# Patient Record
Sex: MALE | Race: WHITE | NOT HISPANIC OR LATINO | Employment: OTHER | URBAN - METROPOLITAN AREA
[De-identification: names, ages, dates, MRNs, and addresses within clinical notes are randomized per-mention and may not be internally consistent; named-entity substitution may affect disease eponyms.]

---

## 2018-05-25 ENCOUNTER — OFFICE VISIT (OUTPATIENT)
Dept: URGENT CARE | Facility: CLINIC | Age: 68
End: 2018-05-25
Payer: COMMERCIAL

## 2018-05-25 VITALS
HEART RATE: 66 BPM | DIASTOLIC BLOOD PRESSURE: 87 MMHG | RESPIRATION RATE: 18 BRPM | OXYGEN SATURATION: 98 % | TEMPERATURE: 97.3 F | SYSTOLIC BLOOD PRESSURE: 165 MMHG

## 2018-05-25 DIAGNOSIS — H73.011 BULLOUS MYRINGITIS, RIGHT EAR: Primary | ICD-10-CM

## 2018-05-25 PROCEDURE — G0463 HOSPITAL OUTPT CLINIC VISIT: HCPCS | Performed by: FAMILY MEDICINE

## 2018-05-25 PROCEDURE — 99203 OFFICE O/P NEW LOW 30 MIN: CPT | Performed by: FAMILY MEDICINE

## 2018-05-25 RX ORDER — CLARITHROMYCIN 500 MG/1
500 TABLET, COATED ORAL EVERY 12 HOURS SCHEDULED
Qty: 14 TABLET | Refills: 0 | Status: SHIPPED | OUTPATIENT
Start: 2018-05-25 | End: 2018-06-01

## 2018-05-25 RX ORDER — OFLOXACIN 3 MG/ML
SOLUTION AURICULAR (OTIC)
Qty: 5 ML | Refills: 1 | Status: SHIPPED | OUTPATIENT
Start: 2018-05-25 | End: 2020-12-09 | Stop reason: ALTCHOICE

## 2018-05-25 RX ORDER — LOSARTAN POTASSIUM 25 MG/1
25 TABLET ORAL DAILY
COMMUNITY
End: 2020-12-09 | Stop reason: ALTCHOICE

## 2018-05-25 NOTE — PATIENT INSTRUCTIONS
As per verbal instructions please keep the right ear totally dry and do not get water in the ear  Your given a card for Dr Daisy Daniels, ears nose and throat doctor  Please call his office today and get a quick appointment  He will be given drops to put into the right ear with cotton to follow twice a day  You have no fever today so oral antibiotics were not given  Should get a fever greater than 101 please return for antibiotics or presents to the local emergency room

## 2018-05-25 NOTE — PROGRESS NOTES
[email or web address] - Patient Visit Note  Cecilia Orr 79 y o  male MRN: [id number]      Assessment / Plan:  Bullous myringitis, right ear [H73 011]  1  Bullous myringitis, right ear  ofloxacin (FLOXIN) 0 3 % otic solution     Reason For Visit / Chief Complaint  Chief Complaint   Patient presents with    Earache     Pt c/o right ear pain since this morning    Georgette Ormond Discussion:  Patient and wife were made aware of the bull eye and perforation that exist on the right ear  There were given a card for the patient to call today, Friday, to see Dr Philipp Saleh, ENT doctor  He will be using the drops as directed he will keep the ear dry and if fever develops over 101 will present to the local emergency room over the holiday weekend or return to this clinic for systemic antibiotics  No systemic antibiotics were given at this time the patient remains afebrile  ADDENDUM:   I spoke with Dr Magdy WANG regarding their protocol for adult bullous myringitis,  He suggested adding biaxin (ordered)  I spoke to Ms Thapa Adrien,  She agrees to adding Biaxin and will  the Rx for her      HPI:  Cecilia Orr is a 79 y o  male Patient           Who  Presents with his wife with a history of recent right ear pain  Patient states he woke up early this morning with extreme pain on the right ear on felt sort of a pop in the right ear and the right ear discharged small amount of blood  Since that time his pain has diminished to 2/10 were previously it was 9/10 prior to the pop and the drainage  The ears presently draining clear fluid at the at this time The patient had dental work done yesterday where a tooth was prepped and a crown was placed on the upper right mandible however no root canal was performed at that time  Patient is a known hypertensive on losartan only he is allergic to no foods or medication    He has had no fever or upper respiratory illness in the past several days he has no recent trauma  He has had many ear infections on the right with scarring noted admits the bullae  Yelena Sole Historical Information   No past medical history on file  No past surgical history on file  Social History   History   Alcohol use Not on file     History   Drug use: Unknown     History   Smoking Status    Not on file   Smokeless Tobacco    Not on file     No family history on file        ALLERGIES:       No Known Allergies    MEDS:    Current Outpatient Prescriptions:     losartan (COZAAR) 25 mg tablet, Take 25 mg by mouth daily, Disp: , Rfl:     ofloxacin (FLOXIN) 0 3 % otic solution, Administer 3 drops into right ear am and pm followed by cotton  Please see the [facility] and Throat Dr also, Disp: 5 mL, Rfl: 1    FACILITY ADMINISTERED MEDS:        REVIEW OF SYSTEMS    GENERAL: NEGATIVE for:  Generalized Fatigue                             Chills                              Fever                             Myalgias     OPTHALMIC: NEGATIVE for:  Diplopia                            Scotomata                            Visual Changes                            Blurred Vision     ENT:  EARS Positive for:  Hearing Difficulty on the right only                            Tinnitus on the right only, low-pitched  No Vertigo  no Dizziness  On the right Ear Pain  On the right Ear Drainage               NOSE NEGATIVE for:  Nasal Congestion                            Nasal Discharge                            Sinus Pain / Pressure               THROAT NEGATIVE for:  Sore Throat / Throat Pain                            Difficulty Swallowing     RESPIRATORY: NEGATIVE for:  Cough                            Wheezing                            Sputum Production                            Sob / Tachypnea                            Hemoptysis     CARDIOVASCULAR: NEGATIVE for:  Chest Pain                             SOB (cardiac Related) Dyspnea on Exertion                             Orthopnea                             PND                             Leg Edema                             Palpitations                               Irregularities/rythym                       CURRENT VITALS:   Blood Pressure: 165/87 (05/25/18 0840)  Pulse: 66 (05/25/18 0840)  Temperature: (!) 97 3 °F (36 3 °C) (05/25/18 0840)  Respirations: 18 (05/25/18 0840)  SpO2: 98 % (05/25/18 0840)  /87   Pulse 66   Temp (!) 97 3 °F (36 3 °C)   Resp 18   SpO2 98%       PHYSICAL EXAM:         General Appearance:    Alert, cooperative, no apparent distress, appears stated age     Oriented x3    Head:    Normocephalic, without obvious abnormality, atraumatic   Eyes:      EOM's intact,      JARRELL,        conjunctiva/corneas clear,          fundi not visualized well   Ears:     Examination the left ear which is a non involved ear shows much scarring from previous infections  Examination of the right ear, involved ear, shows several bull eye on the ear drum 1 of which is ruptured and exiting a small amount of blood  There is clear fluid in the canal at this time  Gross hearing is somewhat diminished on the right          Nose:   Nares normal externally, septum midline,     mucosa normal,     No anterior drainage         Sinuses   with out   tenderness to palpation / percussion     Throat:   Lips, mucosa, and tongue normal       Anterior pharynx   Normal      Posterior pharynx   Normal      No exudate obvious       Neck:   Supple, symmetrical, trachea midline and moveable    Normal thyroid click present    No carotid bruits appreciated        Lymphatics:     Adenopathy in anterior cervical chain  Normal    Adenopathy in posterior cervical chain   Normal     Lungs:     Clear to auscultation bilaterally    No rales    No ronchi    No wheeze     Heart[de-identified]    Regular rate and rhythm, S1 and S2 normal,     No S3, S4, audible    No murmurs, rubs Extremities:     Extremities grossly normal     atraumatic,     no cyanosis or edema        Skin:     Skin color, texture, turgor normal, no rashes or lesions                         Follow up at primary care in 2  days    Counseling / Coordination of Care  Total clinic time spent today  15  minutes  Greater than 50% of total time was spent with the patient and / or family counseling and / or coordination of care  Portions of the record may have been created with voice recognition software   Occasional wrong word or "sound a like" substitutions may have occurred due to the inherent limitations of voice recognition software   Read the chart carefully and recognize, using context, where substitutions have occurred

## 2018-07-31 ENCOUNTER — TRANSCRIBE ORDERS (OUTPATIENT)
Dept: ADMINISTRATIVE | Facility: HOSPITAL | Age: 68
End: 2018-07-31

## 2018-07-31 DIAGNOSIS — H90.3 SENSORY HEARING LOSS, BILATERAL: Primary | ICD-10-CM

## 2018-08-06 ENCOUNTER — HOSPITAL ENCOUNTER (OUTPATIENT)
Dept: CT IMAGING | Facility: HOSPITAL | Age: 68
Discharge: HOME | End: 2018-08-06
Payer: COMMERCIAL

## 2018-08-06 DIAGNOSIS — H90.3 SENSORY HEARING LOSS, BILATERAL: ICD-10-CM

## 2018-08-06 PROCEDURE — 70480 CT ORBIT/EAR/FOSSA W/O DYE: CPT

## 2020-12-09 ENCOUNTER — APPOINTMENT (EMERGENCY)
Dept: CT IMAGING | Facility: HOSPITAL | Age: 70
End: 2020-12-09
Payer: COMMERCIAL

## 2020-12-09 ENCOUNTER — APPOINTMENT (EMERGENCY)
Dept: RADIOLOGY | Facility: HOSPITAL | Age: 70
End: 2020-12-09
Payer: COMMERCIAL

## 2020-12-09 ENCOUNTER — HOSPITAL ENCOUNTER (EMERGENCY)
Facility: HOSPITAL | Age: 70
Discharge: HOME | End: 2020-12-09
Attending: EMERGENCY MEDICINE | Admitting: EMERGENCY MEDICINE
Payer: COMMERCIAL

## 2020-12-09 VITALS
RESPIRATION RATE: 18 BRPM | HEART RATE: 89 BPM | WEIGHT: 168 LBS | OXYGEN SATURATION: 98 % | BODY MASS INDEX: 26.37 KG/M2 | SYSTOLIC BLOOD PRESSURE: 151 MMHG | HEIGHT: 67 IN | DIASTOLIC BLOOD PRESSURE: 72 MMHG | TEMPERATURE: 98 F

## 2020-12-09 DIAGNOSIS — R07.89 STERNUM PAIN: Primary | ICD-10-CM

## 2020-12-09 PROCEDURE — 71046 X-RAY EXAM CHEST 2 VIEWS: CPT

## 2020-12-09 PROCEDURE — G1004 CDSM NDSC: HCPCS

## 2020-12-09 PROCEDURE — 72125 CT NECK SPINE W/O DYE: CPT

## 2020-12-09 PROCEDURE — 96372 THER/PROPH/DIAG INJ SC/IM: CPT

## 2020-12-09 PROCEDURE — 99285 EMERGENCY DEPT VISIT HI MDM: CPT | Performed by: PHYSICIAN ASSISTANT

## 2020-12-09 PROCEDURE — 93005 ELECTROCARDIOGRAM TRACING: CPT

## 2020-12-09 PROCEDURE — 99285 EMERGENCY DEPT VISIT HI MDM: CPT

## 2020-12-09 PROCEDURE — 70450 CT HEAD/BRAIN W/O DYE: CPT

## 2020-12-09 RX ORDER — KETOROLAC TROMETHAMINE 30 MG/ML
15 INJECTION, SOLUTION INTRAMUSCULAR; INTRAVENOUS ONCE
Status: COMPLETED | OUTPATIENT
Start: 2020-12-09 | End: 2020-12-09

## 2020-12-09 RX ORDER — IRBESARTAN 300 MG/1
300 TABLET ORAL DAILY
COMMUNITY
Start: 2020-06-30

## 2020-12-09 RX ORDER — IBUPROFEN 600 MG/1
600 TABLET ORAL EVERY 6 HOURS PRN
Qty: 30 TABLET | Refills: 0 | Status: SHIPPED | OUTPATIENT
Start: 2020-12-09 | End: 2021-01-22 | Stop reason: HOSPADM

## 2020-12-09 RX ADMIN — KETOROLAC TROMETHAMINE 15 MG: 30 INJECTION, SOLUTION INTRAMUSCULAR; INTRAVENOUS at 13:50

## 2020-12-10 LAB
ATRIAL RATE: 100 BPM
P AXIS: 84 DEGREES
PR INTERVAL: 178 MS
QRS AXIS: 24 DEGREES
QRSD INTERVAL: 88 MS
QT INTERVAL: 354 MS
QTC INTERVAL: 456 MS
T WAVE AXIS: 80 DEGREES
VENTRICULAR RATE: 100 BPM

## 2020-12-10 PROCEDURE — 93010 ELECTROCARDIOGRAM REPORT: CPT | Performed by: INTERNAL MEDICINE

## 2021-01-18 ENCOUNTER — APPOINTMENT (EMERGENCY)
Dept: CT IMAGING | Facility: HOSPITAL | Age: 71
DRG: 392 | End: 2021-01-18
Payer: COMMERCIAL

## 2021-01-18 ENCOUNTER — HOSPITAL ENCOUNTER (INPATIENT)
Facility: HOSPITAL | Age: 71
LOS: 4 days | Discharge: HOME | DRG: 392 | End: 2021-01-22
Attending: EMERGENCY MEDICINE | Admitting: SURGERY
Payer: COMMERCIAL

## 2021-01-18 DIAGNOSIS — N13.4 HYDROURETER ON RIGHT: ICD-10-CM

## 2021-01-18 DIAGNOSIS — N17.9 AKI (ACUTE KIDNEY INJURY) (HCC): ICD-10-CM

## 2021-01-18 DIAGNOSIS — K57.80 DIVERTICULITIS OF INTESTINE WITH ABSCESS: Primary | ICD-10-CM

## 2021-01-18 PROBLEM — K57.20 DIVERTICULITIS OF LARGE INTESTINE WITH ABSCESS: Status: ACTIVE | Noted: 2021-01-18

## 2021-01-18 PROBLEM — I10 HYPERTENSION: Status: ACTIVE | Noted: 2021-01-18

## 2021-01-18 LAB
ALBUMIN SERPL BCP-MCNC: 4 G/DL (ref 3.5–5.7)
ALP SERPL-CCNC: 60 U/L (ref 55–165)
ALT SERPL W P-5'-P-CCNC: 13 U/L (ref 7–52)
ANION GAP SERPL CALCULATED.3IONS-SCNC: 11 MMOL/L (ref 4–13)
APTT PPP: 31 SECONDS (ref 23–37)
AST SERPL W P-5'-P-CCNC: 12 U/L (ref 13–39)
BACTERIA UR QL AUTO: NORMAL /HPF
BASOPHILS # BLD AUTO: 0 THOUSANDS/ΜL (ref 0–0.1)
BASOPHILS NFR BLD AUTO: 0 % (ref 0–2)
BILIRUB SERPL-MCNC: 1 MG/DL (ref 0.2–1)
BILIRUB UR QL STRIP: NEGATIVE
BUN SERPL-MCNC: 19 MG/DL (ref 7–25)
CALCIUM SERPL-MCNC: 9.8 MG/DL (ref 8.6–10.5)
CHLORIDE SERPL-SCNC: 102 MMOL/L (ref 98–107)
CLARITY UR: CLEAR
CO2 SERPL-SCNC: 25 MMOL/L (ref 21–31)
COLOR UR: YELLOW
CREAT SERPL-MCNC: 1.22 MG/DL (ref 0.7–1.3)
EOSINOPHIL # BLD AUTO: 0 THOUSAND/ΜL (ref 0–0.61)
EOSINOPHIL NFR BLD AUTO: 0 % (ref 0–5)
ERYTHROCYTE [DISTWIDTH] IN BLOOD BY AUTOMATED COUNT: 14.5 % (ref 11.5–14.5)
FLUAV RNA RESP QL NAA+PROBE: NEGATIVE
FLUBV RNA RESP QL NAA+PROBE: NEGATIVE
GFR SERPL CREATININE-BSD FRML MDRD: 61 ML/MIN/1.73SQ M
GLUCOSE SERPL-MCNC: 115 MG/DL (ref 65–99)
GLUCOSE UR STRIP-MCNC: NEGATIVE MG/DL
HCT VFR BLD AUTO: 41.1 % (ref 42–47)
HGB BLD-MCNC: 13.5 G/DL (ref 14–18)
HGB UR QL STRIP.AUTO: ABNORMAL
INR PPP: 1.08 (ref 0.84–1.19)
KETONES UR STRIP-MCNC: NEGATIVE MG/DL
LACTATE SERPL-SCNC: 1.5 MMOL/L (ref 0.5–2)
LEUKOCYTE ESTERASE UR QL STRIP: NEGATIVE
LIPASE SERPL-CCNC: <10 U/L (ref 11–82)
LYMPHOCYTES # BLD AUTO: 1 THOUSANDS/ΜL (ref 0.6–4.47)
LYMPHOCYTES NFR BLD AUTO: 7 % (ref 21–51)
MCH RBC QN AUTO: 27.5 PG (ref 26–34)
MCHC RBC AUTO-ENTMCNC: 32.9 G/DL (ref 31–37)
MCV RBC AUTO: 84 FL (ref 81–99)
MONOCYTES # BLD AUTO: 1.7 THOUSAND/ΜL (ref 0.17–1.22)
MONOCYTES NFR BLD AUTO: 12 % (ref 2–12)
NEUTROPHILS # BLD AUTO: 11.6 THOUSANDS/ΜL (ref 1.4–6.5)
NEUTS SEG NFR BLD AUTO: 81 % (ref 42–75)
NITRITE UR QL STRIP: NEGATIVE
NON-SQ EPI CELLS URNS QL MICRO: NORMAL /HPF
PH UR STRIP.AUTO: 6 [PH]
PLATELET # BLD AUTO: 216 THOUSANDS/UL (ref 149–390)
PMV BLD AUTO: 9.2 FL (ref 8.6–11.7)
POTASSIUM SERPL-SCNC: 3.9 MMOL/L (ref 3.5–5.5)
PROT SERPL-MCNC: 7.6 G/DL (ref 6.4–8.9)
PROT UR STRIP-MCNC: ABNORMAL MG/DL
PROTHROMBIN TIME: 13.9 SECONDS (ref 11.6–14.5)
RBC # BLD AUTO: 4.92 MILLION/UL (ref 4.3–5.9)
RBC #/AREA URNS AUTO: NORMAL /HPF
RSV RNA RESP QL NAA+PROBE: NEGATIVE
SARS-COV-2 RNA RESP QL NAA+PROBE: NEGATIVE
SODIUM SERPL-SCNC: 138 MMOL/L (ref 134–143)
SP GR UR STRIP.AUTO: 1.01 (ref 1–1.03)
UROBILINOGEN UR QL STRIP.AUTO: 0.2 E.U./DL
WBC # BLD AUTO: 14.3 THOUSAND/UL (ref 4.8–10.8)
WBC #/AREA URNS AUTO: NORMAL /HPF

## 2021-01-18 PROCEDURE — G1004 CDSM NDSC: HCPCS

## 2021-01-18 PROCEDURE — 85025 COMPLETE CBC W/AUTO DIFF WBC: CPT | Performed by: EMERGENCY MEDICINE

## 2021-01-18 PROCEDURE — 36415 COLL VENOUS BLD VENIPUNCTURE: CPT | Performed by: EMERGENCY MEDICINE

## 2021-01-18 PROCEDURE — 74177 CT ABD & PELVIS W/CONTRAST: CPT

## 2021-01-18 PROCEDURE — 96365 THER/PROPH/DIAG IV INF INIT: CPT

## 2021-01-18 PROCEDURE — 99285 EMERGENCY DEPT VISIT HI MDM: CPT | Performed by: EMERGENCY MEDICINE

## 2021-01-18 PROCEDURE — 85610 PROTHROMBIN TIME: CPT | Performed by: EMERGENCY MEDICINE

## 2021-01-18 PROCEDURE — 87040 BLOOD CULTURE FOR BACTERIA: CPT | Performed by: EMERGENCY MEDICINE

## 2021-01-18 PROCEDURE — 85730 THROMBOPLASTIN TIME PARTIAL: CPT | Performed by: EMERGENCY MEDICINE

## 2021-01-18 PROCEDURE — 0241U HB NFCT DS VIR RESP RNA 4 TRGT: CPT | Performed by: EMERGENCY MEDICINE

## 2021-01-18 PROCEDURE — 81003 URINALYSIS AUTO W/O SCOPE: CPT | Performed by: EMERGENCY MEDICINE

## 2021-01-18 PROCEDURE — 83690 ASSAY OF LIPASE: CPT | Performed by: EMERGENCY MEDICINE

## 2021-01-18 PROCEDURE — 81001 URINALYSIS AUTO W/SCOPE: CPT | Performed by: EMERGENCY MEDICINE

## 2021-01-18 PROCEDURE — 94664 DEMO&/EVAL PT USE INHALER: CPT

## 2021-01-18 PROCEDURE — 99285 EMERGENCY DEPT VISIT HI MDM: CPT

## 2021-01-18 PROCEDURE — 99232 SBSQ HOSP IP/OBS MODERATE 35: CPT | Performed by: SURGERY

## 2021-01-18 PROCEDURE — 83605 ASSAY OF LACTIC ACID: CPT | Performed by: EMERGENCY MEDICINE

## 2021-01-18 PROCEDURE — 94760 N-INVAS EAR/PLS OXIMETRY 1: CPT

## 2021-01-18 PROCEDURE — 80053 COMPREHEN METABOLIC PANEL: CPT | Performed by: EMERGENCY MEDICINE

## 2021-01-18 PROCEDURE — 96361 HYDRATE IV INFUSION ADD-ON: CPT

## 2021-01-18 RX ORDER — ONDANSETRON 2 MG/ML
4 INJECTION INTRAMUSCULAR; INTRAVENOUS EVERY 4 HOURS PRN
Status: DISCONTINUED | OUTPATIENT
Start: 2021-01-18 | End: 2021-01-22 | Stop reason: HOSPADM

## 2021-01-18 RX ORDER — ACETAMINOPHEN 325 MG/1
975 TABLET ORAL ONCE
Status: COMPLETED | OUTPATIENT
Start: 2021-01-18 | End: 2021-01-18

## 2021-01-18 RX ORDER — ACETAMINOPHEN 500 MG
500 TABLET ORAL EVERY 6 HOURS PRN
COMMUNITY

## 2021-01-18 RX ORDER — PANTOPRAZOLE SODIUM 40 MG/1
40 TABLET, DELAYED RELEASE ORAL
Status: DISCONTINUED | OUTPATIENT
Start: 2021-01-19 | End: 2021-01-22 | Stop reason: HOSPADM

## 2021-01-18 RX ORDER — ACETAMINOPHEN 325 MG/1
650 TABLET ORAL EVERY 4 HOURS PRN
Status: DISCONTINUED | OUTPATIENT
Start: 2021-01-18 | End: 2021-01-22 | Stop reason: HOSPADM

## 2021-01-18 RX ORDER — LOSARTAN POTASSIUM 50 MG/1
100 TABLET ORAL DAILY
Status: DISCONTINUED | OUTPATIENT
Start: 2021-01-19 | End: 2021-01-20

## 2021-01-18 RX ORDER — SODIUM CHLORIDE, SODIUM LACTATE, POTASSIUM CHLORIDE, CALCIUM CHLORIDE 600; 310; 30; 20 MG/100ML; MG/100ML; MG/100ML; MG/100ML
75 INJECTION, SOLUTION INTRAVENOUS CONTINUOUS
Status: DISPENSED | OUTPATIENT
Start: 2021-01-18 | End: 2021-01-21

## 2021-01-18 RX ORDER — KETOROLAC TROMETHAMINE 30 MG/ML
15 INJECTION, SOLUTION INTRAMUSCULAR; INTRAVENOUS EVERY 6 HOURS SCHEDULED
Status: DISCONTINUED | OUTPATIENT
Start: 2021-01-18 | End: 2021-01-19

## 2021-01-18 RX ORDER — HEPARIN SODIUM 5000 [USP'U]/ML
5000 INJECTION, SOLUTION INTRAVENOUS; SUBCUTANEOUS EVERY 12 HOURS SCHEDULED
Status: DISCONTINUED | OUTPATIENT
Start: 2021-01-18 | End: 2021-01-22 | Stop reason: HOSPADM

## 2021-01-18 RX ADMIN — ACETAMINOPHEN 975 MG: 325 TABLET ORAL at 18:49

## 2021-01-18 RX ADMIN — KETOROLAC TROMETHAMINE 15 MG: 30 INJECTION, SOLUTION INTRAMUSCULAR; INTRAVENOUS at 20:46

## 2021-01-18 RX ADMIN — SODIUM CHLORIDE 1000 ML: 0.9 INJECTION, SOLUTION INTRAVENOUS at 14:26

## 2021-01-18 RX ADMIN — PIPERACILLIN SODIUM AND TAZOBACTAM SODIUM 3.38 G: 3; .375 INJECTION, POWDER, LYOPHILIZED, FOR SOLUTION INTRAVENOUS at 21:48

## 2021-01-18 RX ADMIN — HEPARIN SODIUM 5000 UNITS: 5000 INJECTION INTRAVENOUS; SUBCUTANEOUS at 20:47

## 2021-01-18 RX ADMIN — KETOROLAC TROMETHAMINE 15 MG: 30 INJECTION, SOLUTION INTRAMUSCULAR; INTRAVENOUS at 23:58

## 2021-01-18 RX ADMIN — IOHEXOL 100 ML: 350 INJECTION, SOLUTION INTRAVENOUS at 15:28

## 2021-01-18 RX ADMIN — SODIUM CHLORIDE, SODIUM LACTATE, POTASSIUM CHLORIDE, AND CALCIUM CHLORIDE 125 ML/HR: .6; .31; .03; .02 INJECTION, SOLUTION INTRAVENOUS at 20:46

## 2021-01-18 RX ADMIN — PIPERACILLIN SODIUM AND TAZOBACTAM SODIUM 3.38 G: 3; .375 INJECTION, POWDER, LYOPHILIZED, FOR SOLUTION INTRAVENOUS at 16:51

## 2021-01-18 RX ADMIN — ACETAMINOPHEN 650 MG: 325 TABLET ORAL at 20:47

## 2021-01-18 NOTE — ED PROVIDER NOTES
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 Taking?   acetaminophen (TYLENOL) 500 mg tablet 1/17/2021 at Unknown time  Yes Yes   Sig: Take 500 mg by mouth every 6 (six) hours as needed for mild pain   ibuprofen (MOTRIN) 600 mg tablet   No Yes   Sig: Take 1 tablet (600 mg total) by mouth every 6 (six) hours as needed for mild pain   irbesartan (AVAPRO) 300 mg tablet 1/18/2021 at Unknown time  Yes Yes   Sig: Take 300 mg by mouth daily      Facility-Administered Medications: None       Past Medical History:   Diagnosis Date    Hypertension     Left bundle branch block        Past Surgical History:   Procedure Laterality Date    APPENDECTOMY      BACK SURGERY      TONSILLECTOMY         History reviewed  No pertinent family history  I have reviewed and agree with the history as documented  E-Cigarette/Vaping    E-Cigarette Use Never User      E-Cigarette/Vaping Substances     Social History     Tobacco Use    Smoking status: Never Smoker    Smokeless tobacco: Never Used   Substance Use Topics    Alcohol use: Yes     Frequency: Monthly or less     Drinks per session: 1 or 2     Binge frequency: Never    Drug use: Never       Review of Systems   All other systems reviewed and are negative  Physical Exam  Physical Exam  Constitutional:       General: He is not in acute distress  Appearance: He is well-developed  He is not ill-appearing, toxic-appearing or diaphoretic  HENT:      Head: Normocephalic and atraumatic        Right Ear: External ear normal       Left Ear: External ear normal       Nose: Nose normal       Mouth/Throat:      Mouth: Mucous membranes are moist       Pharynx: No oropharyngeal exudate  Eyes:      General:         Right eye: No discharge  Left eye: No discharge  Conjunctiva/sclera: Conjunctivae normal       Pupils: Pupils are equal, round, and reactive to light  Neck:      Musculoskeletal: Normal range of motion and neck supple  No neck rigidity or muscular tenderness  Vascular: No JVD  Trachea: No tracheal deviation  Cardiovascular:      Rate and Rhythm: Regular rhythm  Tachycardia present  Pulses: Normal pulses  Heart sounds: Normal heart sounds  No murmur  No friction rub  No gallop  Pulmonary:      Effort: Pulmonary effort is normal  No respiratory distress  Breath sounds: No stridor  No wheezing, rhonchi or rales  Chest:      Chest wall: No tenderness  Abdominal:      General: Bowel sounds are normal  There is no distension  Palpations: Abdomen is soft  There is no mass  Tenderness: There is abdominal tenderness  There is guarding  There is no right CVA tenderness, left CVA tenderness or rebound  Hernia: No hernia is present  Comments: Lower abd tenderness most focal the rlq and suprapubic region  guarding toward the suprapubic /rlq region  No cva tenderness  spine is NT>    Musculoskeletal: Normal range of motion  General: No swelling, tenderness, deformity or signs of injury  Right lower leg: No edema  Left lower leg: No edema  Skin:     General: Skin is warm and dry  Capillary Refill: Capillary refill takes less than 2 seconds  Coloration: Skin is not pale  Findings: No rash  Neurological:      General: No focal deficit present  Mental Status: He is alert and oriented to person, place, and time  Sensory: No sensory deficit  Motor: No weakness or abnormal muscle tone        Deep Tendon Reflexes: Reflexes normal  Psychiatric:         Mood and Affect: Mood normal          Vital Signs  ED Triage Vitals   Temperature Pulse Respirations Blood Pressure SpO2   01/18/21 1256 01/18/21 1256 01/18/21 1259 01/18/21 1256 01/18/21 1256   98 2 °F (36 8 °C) (!) 117 18 138/92 98 %      Temp Source Heart Rate Source Patient Position - Orthostatic VS BP Location FiO2 (%)   01/18/21 1256 01/18/21 1256 01/18/21 1256 01/18/21 1256 --   Temporal Monitor Lying Left arm       Pain Score       01/18/21 1256       5           Vitals:    01/18/21 1854 01/18/21 1909 01/18/21 2051 01/18/21 2300   BP: 164/83 146/77  115/60   Pulse: (!) 108 (!) 106 85 83   Patient Position - Orthostatic VS:  Lying  Lying         Visual Acuity      ED Medications  Medications   acetaminophen (TYLENOL) tablet 650 mg (650 mg Oral Given 1/18/21 2047)   piperacillin-tazobactam (ZOSYN) 3 375 g in sodium chloride 0 9 % 100 mL IVPB (3 375 g Intravenous New Bag 1/19/21 0522)   losartan (COZAAR) tablet 100 mg (has no administration in time range)   lactated ringers infusion (125 mL/hr Intravenous New Bag 1/19/21 0608)   ondansetron (ZOFRAN) injection 4 mg (has no administration in time range)   ketorolac (TORADOL) injection 15 mg (15 mg Intravenous Given 1/19/21 0522)   pantoprazole (PROTONIX) EC tablet 40 mg (40 mg Oral Given 1/19/21 0522)   heparin (porcine) subcutaneous injection 5,000 Units (5,000 Units Subcutaneous Given 1/18/21 2047)   sodium chloride 0 9 % bolus 1,000 mL (0 mL Intravenous Stopped 1/18/21 1711)   iohexol (OMNIPAQUE) 350 MG/ML injection (SINGLE-DOSE) 100 mL (100 mL Intravenous Given 1/18/21 1528)   piperacillin-tazobactam (ZOSYN) IVPB 3 375 g (0 g Intravenous Stopped 1/18/21 1721)   acetaminophen (TYLENOL) tablet 975 mg (975 mg Oral Given 1/18/21 1849)       Diagnostic Studies  Results Reviewed     Procedure Component Value Units Date/Time    Blood culture #1 [[id number]] Collected: 01/18/21 1320    Lab Status: Preliminary result Specimen: Blood from Arm, Right Updated: 01/18/21 2301     Blood Culture Received in Microbiology Lab  Culture in Progress  Blood culture #2 [[id number]] Collected: 01/18/21 1320    Lab Status: Preliminary result Specimen: Blood from Arm, Right Updated: 01/18/21 2301     Blood Culture Received in Microbiology Lab  Culture in Progress  Urine Microscopic [[id number]]  (Normal) Collected: 01/18/21 1432    Lab Status: Final result Specimen: Urine, Other Updated: 01/18/21 1503     RBC, UA 1-2 /hpf      WBC, UA 1-2 /hpf      Epithelial Cells Occasional /hpf      Bacteria, UA Occasional /hpf     UA w Reflex to Microscopic w Reflex to Culture [[id number]]  (Abnormal) Collected: 01/18/21 1432    Lab Status: Final result Specimen: Urine, Other Updated: 01/18/21 1438     Color, UA Yellow     Clarity, UA Clear     Specific Gravity, UA 1 015     pH, UA 6 0     Leukocytes, UA Negative     Nitrite, UA Negative     Protein, UA 1+ mg/dl      Glucose, UA Negative mg/dl      Ketones, UA Negative mg/dl      Urobilinogen, UA 0 2 E U /dl      Bilirubin, UA Negative     Blood, UA 2+    COVID19, Influenza A/B, RSV PCR, [facility] [[id number]]  (Normal) Collected: 01/18/21 1320    Lab Status: Final result Specimen: Nares from Nasopharyngeal Swab Updated: 01/18/21 1418     SARS-CoV-2 Negative     INFLUENZA A PCR Negative     INFLUENZA B PCR Negative     RSV PCR Negative    Narrative: This test has been authorized by FDA under an EUA (Emergency Use Assay) for use by authorized laboratories  Clinical caution and judgement should be used with the interpretation of these results with consideration of the clinical impression and other laboratory testing  Testing reported as "Positive" or "Negative" has been proven to be accurate according to standard laboratory validation requirements  All testing is performed with control materials showing appropriate reactivity at standard intervals      Comprehensive metabolic panel [[id number]]  (Abnormal) Collected: 01/18/21 1320 Lab Status: Final result Specimen: Blood from Arm, Right Updated: 01/18/21 1354     Sodium 138 mmol/L      Potassium 3 9 mmol/L      Chloride 102 mmol/L      CO2 25 mmol/L      ANION GAP 11 mmol/L      BUN 19 mg/dL      Creatinine 1 22 mg/dL      Glucose 115 mg/dL      Calcium 9 8 mg/dL      AST 12 U/L      ALT 13 U/L      Alkaline Phosphatase 60 U/L      Total Protein 7 6 g/dL      Albumin 4 0 g/dL      Total Bilirubin 1 00 mg/dL      eGFR 61 ml/min/1 73sq m     Narrative:      [place] guidelines for Chronic Kidney Disease (CKD):     Stage 1 with normal or high GFR (GFR > 90 mL/min/1 73 square meters)    Stage 2 Mild CKD (GFR = 60-89 mL/min/1 73 square meters)    Stage 3A Moderate CKD (GFR = 45-59 mL/min/1 73 square meters)    Stage 3B Moderate CKD (GFR = 30-44 mL/min/1 73 square meters)    Stage 4 Severe CKD (GFR = 15-29 mL/min/1 73 square meters)    Stage 5 End Stage CKD (GFR <15 mL/min/1 73 square meters)  Note: GFR calculation is accurate only with a steady state creatinine    Lipase [[id number]]  (Abnormal) Collected: 01/18/21 1320    Lab Status: Final result Specimen: Blood from Arm, Right Updated: 01/18/21 1354     Lipase <10 u/L     Lactic acid [[id number]]  (Normal) Collected: 01/18/21 1320    Lab Status: Final result Specimen: Blood from Arm, Right Updated: 01/18/21 1353     LACTIC ACID 1 5 mmol/L     Narrative:      Result may be elevated if tourniquet was used during collection      Protime-INR [[id number]]  (Normal) Collected: 01/18/21 1320    Lab Status: Final result Specimen: Blood from Arm, Right Updated: 01/18/21 1348     Protime 13 9 seconds      INR 1 08    APTT [[id number]]  (Normal) Collected: 01/18/21 1320    Lab Status: Final result Specimen: Blood from Arm, Right Updated: 01/18/21 1348     PTT 31 seconds     CBC and differential [[id number]]  (Abnormal) Collected: 01/18/21 1320    Lab Status: Final result Specimen: Blood from Arm, Right Updated: 01/18/21 1339 WBC 14 30 Thousand/uL      RBC 4 92 Million/uL      Hemoglobin 13 5 g/dL      Hematocrit 41 1 %      MCV 84 fL      MCH 27 5 pg      MCHC 32 9 g/dL      RDW 14 5 %      MPV 9 2 fL      Platelets 194 Thousands/uL      Neutrophils Relative 81 %      Lymphocytes Relative 7 %      Monocytes Relative 12 %      Eosinophils Relative 0 %      Basophils Relative 0 %      Neutrophils Absolute 11 60 Thousands/µL      Lymphocytes Absolute 1 00 Thousands/µL      Monocytes Absolute 1 70 Thousand/µL      Eosinophils Absolute 0 00 Thousand/µL      Basophils Absolute 0 00 Thousands/µL                  CT abdomen pelvis with contrast   Final Result by Emely Parks MD (01/18 5217)   1  Infiltrative changes related to the mid sigmoid colon on the right suspicious for diverticulitis  Associated partially loculated fluid collection here suspicious for developing abscess  This measures up to 5 6 cm    2   Mild right-sided hydroureter down to the level of the sigmoid inflammation  3   Possible pancreatic nodule measuring up to 1 7 cm  Recommend follow-up with dedicated MRI of the pancreas with and without IV contrast on a nonemergent basis  The study was marked in [facility] for immediate notification  Workstation performed: [id number]                    Procedures  Procedures         ED Course                                           MDM  Number of Diagnoses or Management Options  Diverticulitis of intestine with abscess:   Diagnosis management comments: Pt aware of incidentals, discussed panc mass  Discussed case with dr Ancelmo Bond of surgery, he has seen in er and accepted pt  Pt remains hemodynamically stable wo complaints while in er         Disposition  Final diagnoses:   Diverticulitis of intestine with abscess     Time reflects when diagnosis was documented in both MDM as applicable and the Disposition within this note     Time User Action Codes Description Comment    1/18/2021  6:44 PM Esau Cunningham [K57 80] Diverticulitis of intestine with abscess     1/18/2021  6:44 PM Rebel Aguilera Add [N13 5] Ureteral obstruction, left     1/18/2021  6:45 PM Tal Aguilerauel [N13 5] Ureteral obstruction, left     1/18/2021  8:05 PM Seabron Block Add [N13 4] Hydroureter on right       ED Disposition     ED Disposition Condition Date/Time Comment    Admit Stable Mon Jan 18, 2021  6:44 PM Case was discussed with Dr Ayad Snyder and the patient's admission status was agreed to be Admission Status: inpatient status to the service of Dr Ayad Snyder   Follow-up Information    None         Current Discharge Medication List      CONTINUE these medications which have NOT CHANGED    Details   acetaminophen (TYLENOL) 500 mg tablet Take 500 mg by mouth every 6 (six) hours as needed for mild pain      ibuprofen (MOTRIN) 600 mg tablet Take 1 tablet (600 mg total) by mouth every 6 (six) hours as needed for mild pain  Qty: 30 tablet, Refills: 0    Associated Diagnoses: Sternum pain      irbesartan (AVAPRO) 300 mg tablet Take 300 mg by mouth daily           No discharge procedures on file      PDMP Review     None          ED Provider  Electronically Signed by           Thai Ochoa MD  01/19/21 9938

## 2021-01-19 ENCOUNTER — APPOINTMENT (INPATIENT)
Dept: CT IMAGING | Facility: HOSPITAL | Age: 71
DRG: 392 | End: 2021-01-19
Attending: RADIOLOGY
Payer: COMMERCIAL

## 2021-01-19 LAB
AMORPH URATE CRY URNS QL MICRO: ABNORMAL /HPF
ANION GAP SERPL CALCULATED.3IONS-SCNC: 11 MMOL/L (ref 4–13)
BACTERIA UR QL AUTO: ABNORMAL /HPF
BASOPHILS # BLD AUTO: 0 THOUSANDS/ΜL (ref 0–0.1)
BASOPHILS NFR BLD AUTO: 0 % (ref 0–2)
BILIRUB UR QL STRIP: ABNORMAL
BUN SERPL-MCNC: 25 MG/DL (ref 7–25)
CALCIUM SERPL-MCNC: 8.7 MG/DL (ref 8.6–10.5)
CHLORIDE SERPL-SCNC: 105 MMOL/L (ref 98–107)
CLARITY UR: CLEAR
CO2 SERPL-SCNC: 23 MMOL/L (ref 21–31)
COARSE GRAN CASTS URNS QL MICRO: ABNORMAL /LPF
COLOR UR: YELLOW
CREAT SERPL-MCNC: 1.6 MG/DL (ref 0.7–1.3)
EOSINOPHIL # BLD AUTO: 0 THOUSAND/ΜL (ref 0–0.61)
EOSINOPHIL NFR BLD AUTO: 0 % (ref 0–5)
ERYTHROCYTE [DISTWIDTH] IN BLOOD BY AUTOMATED COUNT: 14.5 % (ref 11.5–14.5)
GFR SERPL CREATININE-BSD FRML MDRD: 44 ML/MIN/1.73SQ M
GLUCOSE SERPL-MCNC: 113 MG/DL (ref 65–99)
GLUCOSE UR STRIP-MCNC: NEGATIVE MG/DL
HCT VFR BLD AUTO: 38 % (ref 42–47)
HGB BLD-MCNC: 12.5 G/DL (ref 14–18)
HGB UR QL STRIP.AUTO: ABNORMAL
KETONES UR STRIP-MCNC: ABNORMAL MG/DL
LEUKOCYTE ESTERASE UR QL STRIP: NEGATIVE
LYMPHOCYTES # BLD AUTO: 0.6 THOUSANDS/ΜL (ref 0.6–4.47)
LYMPHOCYTES NFR BLD AUTO: 6 % (ref 21–51)
MAGNESIUM SERPL-MCNC: 1.8 MG/DL (ref 1.9–2.7)
MCH RBC QN AUTO: 27.7 PG (ref 26–34)
MCHC RBC AUTO-ENTMCNC: 32.8 G/DL (ref 31–37)
MCV RBC AUTO: 84 FL (ref 81–99)
MONOCYTES # BLD AUTO: 1 THOUSAND/ΜL (ref 0.17–1.22)
MONOCYTES NFR BLD AUTO: 10 % (ref 2–12)
NEUTROPHILS # BLD AUTO: 8.5 THOUSANDS/ΜL (ref 1.4–6.5)
NEUTS SEG NFR BLD AUTO: 84 % (ref 42–75)
NITRITE UR QL STRIP: NEGATIVE
NON-SQ EPI CELLS URNS QL MICRO: ABNORMAL /HPF
PH UR STRIP.AUTO: 5.5 [PH]
PHOSPHATE SERPL-MCNC: 3.4 MG/DL (ref 3–5.5)
PLATELET # BLD AUTO: 179 THOUSANDS/UL (ref 149–390)
PMV BLD AUTO: 9.7 FL (ref 8.6–11.7)
POTASSIUM SERPL-SCNC: 3.7 MMOL/L (ref 3.5–5.5)
PROT UR STRIP-MCNC: ABNORMAL MG/DL
RBC # BLD AUTO: 4.51 MILLION/UL (ref 4.3–5.9)
RBC #/AREA URNS AUTO: ABNORMAL /HPF
SODIUM SERPL-SCNC: 139 MMOL/L (ref 134–143)
SP GR UR STRIP.AUTO: 1.02 (ref 1–1.03)
UROBILINOGEN UR QL STRIP.AUTO: 2 E.U./DL
WBC # BLD AUTO: 10.1 THOUSAND/UL (ref 4.8–10.8)
WBC #/AREA URNS AUTO: ABNORMAL /HPF

## 2021-01-19 PROCEDURE — 85025 COMPLETE CBC W/AUTO DIFF WBC: CPT | Performed by: SURGERY

## 2021-01-19 PROCEDURE — 87070 CULTURE OTHR SPECIMN AEROBIC: CPT | Performed by: SURGERY

## 2021-01-19 PROCEDURE — 81001 URINALYSIS AUTO W/SCOPE: CPT | Performed by: SURGERY

## 2021-01-19 PROCEDURE — 87077 CULTURE AEROBIC IDENTIFY: CPT | Performed by: SURGERY

## 2021-01-19 PROCEDURE — C1729 CATH, DRAINAGE: HCPCS

## 2021-01-19 PROCEDURE — C1769 GUIDE WIRE: HCPCS

## 2021-01-19 PROCEDURE — 99153 MOD SED SAME PHYS/QHP EA: CPT

## 2021-01-19 PROCEDURE — 87205 SMEAR GRAM STAIN: CPT | Performed by: SURGERY

## 2021-01-19 PROCEDURE — 99024 POSTOP FOLLOW-UP VISIT: CPT | Performed by: RADIOLOGY

## 2021-01-19 PROCEDURE — 80048 BASIC METABOLIC PNL TOTAL CA: CPT | Performed by: SURGERY

## 2021-01-19 PROCEDURE — 99152 MOD SED SAME PHYS/QHP 5/>YRS: CPT

## 2021-01-19 PROCEDURE — 10030 IMG GID FLU COLL DRG SFT TIS: CPT

## 2021-01-19 PROCEDURE — 0W9J30Z DRAINAGE OF PELVIC CAVITY WITH DRAINAGE DEVICE, PERCUTANEOUS APPROACH: ICD-10-PCS | Performed by: RADIOLOGY

## 2021-01-19 PROCEDURE — 84100 ASSAY OF PHOSPHORUS: CPT | Performed by: SURGERY

## 2021-01-19 PROCEDURE — 83735 ASSAY OF MAGNESIUM: CPT | Performed by: SURGERY

## 2021-01-19 PROCEDURE — 49406 IMAGE CATH FLUID PERI/RETRO: CPT | Performed by: RADIOLOGY

## 2021-01-19 PROCEDURE — 99232 SBSQ HOSP IP/OBS MODERATE 35: CPT | Performed by: SURGERY

## 2021-01-19 PROCEDURE — NC001 PR NO CHARGE: Performed by: RADIOLOGY

## 2021-01-19 PROCEDURE — 99152 MOD SED SAME PHYS/QHP 5/>YRS: CPT | Performed by: RADIOLOGY

## 2021-01-19 RX ORDER — SODIUM CHLORIDE 9 MG/ML
10 INJECTION INTRAVENOUS DAILY
Qty: 30 SYRINGE | Refills: 0 | Status: SHIPPED | OUTPATIENT
Start: 2021-01-19

## 2021-01-19 RX ORDER — MAGNESIUM SULFATE HEPTAHYDRATE 40 MG/ML
2 INJECTION, SOLUTION INTRAVENOUS ONCE
Status: COMPLETED | OUTPATIENT
Start: 2021-01-19 | End: 2021-01-19

## 2021-01-19 RX ORDER — FENTANYL CITRATE 50 UG/ML
INJECTION, SOLUTION INTRAMUSCULAR; INTRAVENOUS CODE/TRAUMA/SEDATION MEDICATION
Status: COMPLETED | OUTPATIENT
Start: 2021-01-19 | End: 2021-01-19

## 2021-01-19 RX ORDER — LIDOCAINE WITH 8.4% SOD BICARB 0.9%(10ML)
SYRINGE (ML) INJECTION CODE/TRAUMA/SEDATION MEDICATION
Status: COMPLETED | OUTPATIENT
Start: 2021-01-19 | End: 2021-01-19

## 2021-01-19 RX ORDER — MEPERIDINE HYDROCHLORIDE 25 MG/ML
25 INJECTION INTRAMUSCULAR; INTRAVENOUS; SUBCUTANEOUS EVERY 6 HOURS PRN
Status: DISCONTINUED | OUTPATIENT
Start: 2021-01-19 | End: 2021-01-19

## 2021-01-19 RX ORDER — MIDAZOLAM HYDROCHLORIDE 2 MG/2ML
INJECTION, SOLUTION INTRAMUSCULAR; INTRAVENOUS CODE/TRAUMA/SEDATION MEDICATION
Status: COMPLETED | OUTPATIENT
Start: 2021-01-19 | End: 2021-01-19

## 2021-01-19 RX ORDER — MEPERIDINE HYDROCHLORIDE 25 MG/ML
25 INJECTION INTRAMUSCULAR; INTRAVENOUS; SUBCUTANEOUS ONCE AS NEEDED
Status: DISCONTINUED | OUTPATIENT
Start: 2021-01-19 | End: 2021-01-22 | Stop reason: HOSPADM

## 2021-01-19 RX ADMIN — FENTANYL CITRATE 50 MCG: 50 INJECTION INTRAMUSCULAR; INTRAVENOUS at 17:44

## 2021-01-19 RX ADMIN — SODIUM CHLORIDE, SODIUM LACTATE, POTASSIUM CHLORIDE, AND CALCIUM CHLORIDE 125 ML/HR: .6; .31; .03; .02 INJECTION, SOLUTION INTRAVENOUS at 06:08

## 2021-01-19 RX ADMIN — ACETAMINOPHEN 650 MG: 325 TABLET ORAL at 14:44

## 2021-01-19 RX ADMIN — FENTANYL CITRATE 100 MCG: 50 INJECTION INTRAMUSCULAR; INTRAVENOUS at 17:38

## 2021-01-19 RX ADMIN — FENTANYL CITRATE 50 MCG: 50 INJECTION INTRAMUSCULAR; INTRAVENOUS at 17:28

## 2021-01-19 RX ADMIN — MIDAZOLAM HYDROCHLORIDE 1 MG: 1 INJECTION, SOLUTION INTRAMUSCULAR; INTRAVENOUS at 17:20

## 2021-01-19 RX ADMIN — MAGNESIUM SULFATE IN WATER 2 G: 40 INJECTION, SOLUTION INTRAVENOUS at 14:31

## 2021-01-19 RX ADMIN — FENTANYL CITRATE 50 MCG: 50 INJECTION INTRAMUSCULAR; INTRAVENOUS at 17:20

## 2021-01-19 RX ADMIN — PIPERACILLIN SODIUM AND TAZOBACTAM SODIUM 3.38 G: 3; .375 INJECTION, POWDER, LYOPHILIZED, FOR SOLUTION INTRAVENOUS at 18:11

## 2021-01-19 RX ADMIN — HEPARIN SODIUM 5000 UNITS: 5000 INJECTION INTRAVENOUS; SUBCUTANEOUS at 20:30

## 2021-01-19 RX ADMIN — MIDAZOLAM HYDROCHLORIDE 1 MG: 1 INJECTION, SOLUTION INTRAMUSCULAR; INTRAVENOUS at 17:28

## 2021-01-19 RX ADMIN — PIPERACILLIN SODIUM AND TAZOBACTAM SODIUM 3.38 G: 3; .375 INJECTION, POWDER, LYOPHILIZED, FOR SOLUTION INTRAVENOUS at 05:22

## 2021-01-19 RX ADMIN — KETOROLAC TROMETHAMINE 15 MG: 30 INJECTION, SOLUTION INTRAMUSCULAR; INTRAVENOUS at 05:22

## 2021-01-19 RX ADMIN — Medication 10 ML: at 17:30

## 2021-01-19 RX ADMIN — PIPERACILLIN SODIUM AND TAZOBACTAM SODIUM 3.38 G: 3; .375 INJECTION, POWDER, LYOPHILIZED, FOR SOLUTION INTRAVENOUS at 23:45

## 2021-01-19 RX ADMIN — SODIUM CHLORIDE, SODIUM LACTATE, POTASSIUM CHLORIDE, AND CALCIUM CHLORIDE 150 ML/HR: .6; .31; .03; .02 INJECTION, SOLUTION INTRAVENOUS at 16:25

## 2021-01-19 RX ADMIN — ACETAMINOPHEN 650 MG: 325 TABLET ORAL at 20:29

## 2021-01-19 RX ADMIN — PANTOPRAZOLE SODIUM 40 MG: 40 TABLET, DELAYED RELEASE ORAL at 05:22

## 2021-01-19 RX ADMIN — PIPERACILLIN SODIUM AND TAZOBACTAM SODIUM 3.38 G: 3; .375 INJECTION, POWDER, LYOPHILIZED, FOR SOLUTION INTRAVENOUS at 10:50

## 2021-01-19 NOTE — RESPIRATORY THERAPY NOTE
RT Protocol Note  Sabrina De La O 78 y o  male MRN: [id number]  Unit/Bed#: -01 Encounter: [id number]    Assessment    Principal Problem:    Diverticulitis of large intestine with abscess  Active Problems:    Hypertension      Home Pulmonary Medications:    Home Devices/Therapy: (no home resp therapy )    Past Medical History:   Diagnosis Date    Hypertension     Left bundle branch block      Social History     Socioeconomic History    Marital status: /Civil Union     Spouse name: None    Number of children: None    Years of education: None    Highest education level: None   Occupational History    None   Social Needs    Financial resource strain: None    Food insecurity     Worry: None     Inability: None    Transportation needs     Medical: None     Non-medical: None   Tobacco Use    Smoking status: Never Smoker    Smokeless tobacco: Never Used   Substance and Sexual Activity    Alcohol use: Yes     Frequency: Monthly or less     Drinks per session: 1 or 2     Binge frequency: Never    Drug use: Never    Sexual activity: None   Lifestyle    Physical activity     Days per week: None     Minutes per session: None    Stress: None   Relationships    Social connections     Talks on phone: None     Gets together: None     Attends Jew service: None     Active member of club or organization: None     Attends meetings of clubs or organizations: None     Relationship status: None    Intimate partner violence     Fear of current or ex partner: None     Emotionally abused: None     Physically abused: None     Forced sexual activity: None   Other Topics Concern    None   Social History Narrative    None       Subjective         Objective    Physical Exam:   Assessment Type: Assess only  Respiratory Pattern: Normal  Chest Assessment: Chest expansion symmetrical  Bilateral Breath Sounds: Diminished, Clear    Vitals:  Blood pressure 115/60, pulse 83, temperature 97 9 °F (36 6 °C), temperature source Temporal, resp  rate 18, height 5' 7" (1 702 m), weight 79 3 kg (174 lb 13 2 oz), SpO2 97 %  Imaging and other studies: I have personally reviewed pertinent reports              Plan    Respiratory Plan: Discontinue Protocol          pt assessed no resp distress pt has been instructed on I/s, no pulm hx no home resp therapy will discontinue protocol as no indication for neb txs

## 2021-01-19 NOTE — PROCEDURES
Interventional Radiology Procedure Note    PATIENT NAME: Minal Situ  : 1950  MRN: [id number]     Pre-op Diagnosis:   1  Diverticulitis of intestine with abscess    2  Hydroureter on right      2  Pelvic abscess    Post-op Diagnosis:   1  Diverticulitis of intestine with abscess    2  Hydroureter on right      2     Same    Procedure: abscess drain    Surgeon:   Brianna Roa MD  Assistants:     No qualified resident was available, Resident is only observing    Estimated Blood Loss: Minimal     Findings: 20cc of pus    Specimens: Cx    Complications:  None     Anesthesia: conscious sedation and local    Brianna Roa MD     Date: 2021  Time: 5:55 PM

## 2021-01-19 NOTE — H&P
H&P Exam - General Surgery   Minal Situ 78 y o  male MRN: [id number]  Unit/Bed#: -01 Encounter: [id number]    Assessment/Plan     Assessment:  Patient is a pleasant 72-year-old male presenting to the hospital with signs and symptoms of a perforated sigmoid diverticulitis with intra-abdominal abscess and mild right hydroureter for which inpatient admission is now indicated  Plan:  1  Inpatient admission  2  Zosyn 3 365 g IV q 6 hours  3  Toradol 15 mg IV q 6 hours  4  Consultation with Interventional Radiology  5  Consultation with Urology     History of Present Illness      HPI:  Minal Situ is a 78 y o  male who presents with signs and symptoms of a perforated sigmoid diverticular abscess  Review of Systems   Constitutional: Positive for activity change, appetite change and fatigue  Negative for chills and fever  HENT: Negative for ear pain and sore throat  Eyes: Negative for pain and visual disturbance  Respiratory: Negative for cough and shortness of breath  Cardiovascular: Negative for chest pain and palpitations  Gastrointestinal: Positive for abdominal distention and abdominal pain  Negative for vomiting  Last colonoscopy approximately 10 years ago  Normal study  Patient was told at that time of his diagnosis for diverticulosis   Genitourinary: Negative for dysuria and hematuria  Musculoskeletal: Negative for arthralgias and back pain  Skin: Negative for color change and rash  Allergic/Immunologic: Negative  Neurological: Negative for seizures and syncope  Hematological: Negative  Psychiatric/Behavioral: Negative  All other systems reviewed and are negative        Historical Information   Past Medical History:   Diagnosis Date    Hypertension     Left bundle branch block      Past Surgical History:   Procedure Laterality Date    APPENDECTOMY      BACK SURGERY      TONSILLECTOMY       Social History   Social History     Substance and Sexual Activity Alcohol Use Yes    Frequency: Monthly or less    Drinks per session: 1 or 2    Binge frequency: Never     Social History     Substance and Sexual Activity   Drug Use Never     Social History     Tobacco Use   Smoking Status Never Smoker   Smokeless Tobacco Never Used     E-Cigarette/Vaping    E-Cigarette Use Never User      E-Cigarette/Vaping Substances     Family History: non-contributory    Meds/Allergies   all medications and allergies reviewed  Allergies   Allergen Reactions    Hydrocodone-Acetaminophen Other (See Comments)     disoriented       Objective   First Vitals:   Blood Pressure: 138/92 (01/18/21 1256)  Pulse: (!) 117 (01/18/21 1256)  Temperature: 98 2 °F (36 8 °C) (01/18/21 1256)  Temp Source: Temporal (01/18/21 1256)  Respirations: 18 (01/18/21 1259)  Height: 5' 7" (170 2 cm) (01/18/21 1256)  Weight - Scale: 79 3 kg (174 lb 13 2 oz) (01/18/21 1256)  SpO2: 98 % (01/18/21 1256)    Current Vitals:   Blood Pressure: 146/77 (01/18/21 1909)  Pulse: (!) 106 (01/18/21 1909)  Temperature: (!) 101 5 °F (38 6 °C) (01/18/21 1909)  Temp Source: Temporal (01/18/21 1909)  Respirations: 16 (01/18/21 1909)  Height: 5' 7" (170 2 cm) (01/18/21 1256)  Weight - Scale: 79 3 kg (174 lb 13 2 oz) (01/18/21 1256)  SpO2: 95 % (01/18/21 1909)      Intake/Output Summary (Last 24 hours) at 1/18/2021 1956  Last data filed at 1/18/2021 1721  Gross per 24 hour   Intake 1100 ml   Output --   Net 1100 ml       Invasive Devices     Peripheral Intravenous Line            Peripheral IV 01/18/21 Right Antecubital less than 1 day                Physical Exam  Vitals signs and nursing note reviewed  Constitutional:       Appearance: He is well-developed  HENT:      Head: Normocephalic and atraumatic  Eyes:      Conjunctiva/sclera: Conjunctivae normal    Neck:      Musculoskeletal: Neck supple  Cardiovascular:      Rate and Rhythm: Normal rate and regular rhythm  Heart sounds: No murmur     Pulmonary:      Effort: Pulmonary effort is normal  No respiratory distress  Breath sounds: Normal breath sounds  Abdominal:      Palpations: Abdomen is soft  Tenderness: There is no abdominal tenderness  Comments: Mild lower abdominal tenderness to percussion and palpation  No true guarding rebound or peritoneal signs  No masses noted no hernias appreciated  Skin:     General: Skin is warm and dry  Neurological:      Mental Status: He is alert  Lab Results: I have personally reviewed pertinent lab results  Imaging: I have personally reviewed pertinent reports  EKG, Pathology, and Other Studies: I have personally reviewed pertinent reports  Code Status: No Order  Advance Directive and Living Will:      Power of :    POLST:      Counseling / Coordination of Care  Total floor / unit time spent today 35 minutes  Greater than 50% of total time was spent with the patient and / or family counseling and / or coordination of care  A description of the counseling / coordination of care: 15

## 2021-01-19 NOTE — ASSESSMENT & PLAN NOTE
Hospital day 1 with acute sigmoid diverticulitis complicated by intra-abdominal abscess  Patient reports feeling well  He denies all complaints referable to his abdomen  Physical examination:  Vital signs low-grade temperature 100 1° all other vital signs stable  In general he is a well-nourished well-appearing male in no acute distress awake alert oriented  Abdomen soft and nontender to palpation in 4 quadrants  No guarding rebound or peritoneal signs  Plan:  1  Percutaneous drainage of diverticular abscess by Interventional Radiology  2  Advance to clear liquid diet  3  Urology consultation  4  Continued parental antibiotics  5  Toradol discontinued secondary to elevation of creatinine

## 2021-01-19 NOTE — CASE MANAGEMENT
Pt is not a 30 day readmission not high risk green 8, assessment was completed , pt was made aware of cm role, pt is independent and drive, pt lives with his wife in a 3 steory home, pt's bedroom and bath on the lower level, there is another br on the 2nd level 3rd level is an attic, RX plan [facility], no hx of HHC, DME: none, pt denies smoking and sttes he drinks alcohol on occasion, pt denies any d/c needs, pt's wife will transport the pt home when stable for d/c ,  pt has an  Ir consult  cm will continue to follow and assess for any additional d/c needs  CM reviewed d/c planning process including the following: identifying help at home, patient preference for d/c planning needs, availability of treatment team to discuss questions or concerns patient and/or family may have regarding understanding medications and recognizing signs and symptoms once discharged  CM also encouraged patient to follow up with all recommended appointments after discharge  Patient advised of importance for patient and family to participate in managing patients medical well being

## 2021-01-19 NOTE — PLAN OF CARE
Problem: Potential for Falls  Goal: Patient will remain free of falls  Description: INTERVENTIONS:  - Assess patient frequently for physical needs  -  Identify cognitive and physical deficits and behaviors that affect risk of falls    -  [place] fall precautions as indicated by assessment   - Educate patient/family on patient safety including physical limitations  - Instruct patient to call for assistance with activity based on assessment  - Modify environment to reduce risk of injury  - Consider OT/PT consult to assist with strengthening/mobility  Outcome: Progressing     Problem: PAIN - ADULT  Goal: Verbalizes/displays adequate comfort level or baseline comfort level  Description: Interventions:  - Encourage patient to monitor pain and request assistance  - Assess pain using appropriate pain scale  - Administer analgesics based on type and severity of pain and evaluate response  - Implement non-pharmacological measures as appropriate and evaluate response  - Consider cultural and social influences on pain and pain management  - Notify physician/advanced practitioner if interventions unsuccessful or patient reports new pain  Outcome: Progressing     Problem: INFECTION - ADULT  Goal: Absence or prevention of progression during hospitalization  Description: INTERVENTIONS:  - Assess and monitor for signs and symptoms of infection  - Monitor lab/diagnostic results  - Monitor all insertion sites, i e  indwelling lines, tubes, and drains  - Monitor endotracheal if appropriate and nasal secretions for changes in amount and color  - [place] appropriate cooling/warming therapies per order  - Administer medications as ordered  - Instruct and encourage patient and family to use good hand hygiene technique  - Identify and instruct in appropriate isolation precautions for identified infection/condition  Outcome: Progressing  Goal: Absence of fever/infection during neutropenic period  Description: INTERVENTIONS:  - Monitor WBC    Outcome: Progressing     Problem: SAFETY ADULT  Goal: Maintain or return to baseline ADL function  Description: INTERVENTIONS:  -  Assess patient's ability to carry out ADLs; assess patient's baseline for ADL function and identify physical deficits which impact ability to perform ADLs (bathing, care of mouth/teeth, toileting, grooming, dressing, etc )  - Assess/evaluate cause of self-care deficits   - Assess range of motion  - Assess patient's mobility; develop plan if impaired  - Assess patient's need for assistive devices and provide as appropriate  - Encourage maximum independence but intervene and supervise when necessary  - Involve family in performance of ADLs  - Assess for home care needs following discharge   - Consider OT consult to assist with ADL evaluation and planning for discharge  - Provide patient education as appropriate  Outcome: Progressing  Goal: Maintain or return mobility status to optimal level  Description: INTERVENTIONS:  - Assess patient's baseline mobility status (ambulation, transfers, stairs, etc )    - Identify cognitive and physical deficits and behaviors that affect mobility  - Identify mobility aids required to assist with transfers and/or ambulation (gait belt, sit-to-stand, lift, walker, cane, etc )  - [place] fall precautions as indicated by assessment  - Record patient progress and toleration of activity level on Mobility SBAR; progress patient to next Phase/Stage  - Instruct patient to call for assistance with activity based on assessment  - Consider rehabilitation consult to assist with strengthening/weightbearing, etc   Outcome: Progressing     Problem: DISCHARGE PLANNING  Goal: Discharge to home or other facility with appropriate resources  Description: INTERVENTIONS:  - Identify barriers to discharge w/patient and caregiver  - Arrange for needed discharge resources and transportation as appropriate  - Identify discharge learning needs (meds, wound care, etc )  - Arrange for interpretive services to assist at discharge as needed  - Refer to Case Management Department for coordinating discharge planning if the patient needs post-hospital services based on physician/advanced practitioner order or complex needs related to functional status, cognitive ability, or social support system  Outcome: Progressing     Problem: Knowledge Deficit  Goal: Patient/family/caregiver demonstrates understanding of disease process, treatment plan, medications, and discharge instructions  Description: Complete learning assessment and assess knowledge base    Interventions:  - Provide teaching at level of understanding  - Provide teaching via preferred learning methods  Outcome: Progressing

## 2021-01-19 NOTE — UTILIZATION REVIEW
Initial Clinical Review    Admission: Date/Time/Statement:   Admission Orders (From admission, onward)     Ordered        01/18/21 1845  Inpatient Admission  Once                   Orders Placed This Encounter   Procedures    Inpatient Admission     Standing Status:   Standing     Number of Occurrences:   1     Order Specific Question:   Level of Care     Answer:   Med Surg [16]     Order Specific Question:   Estimated length of stay     Answer:   More than 2 Midnights     Order Specific Question:   Certification     Answer:   I certify that inpatient services are medically necessary for this patient for a duration of greater than two midnights  See H&P and MD Progress Notes for additional information about the patient's course of treatment  ED Arrival Information     Expected Arrival Acuity Means of Arrival Escorted By Service Admission Type    - 1/18/2021 12:31 Urgent Walk-In Spouse Surgery-General Urgent    Arrival Complaint    fever / abdominal pain        Chief Complaint   Patient presents with    Abdominal Pain     fever, back pain     Assessment/Plan:   78 yom to er from home c/o 3 days lower abd pain with radiation to back, fever last night  Similar s/s in Sept with ruptured appy (colostomy & revision)  Hx HTN, LBBB  Presents tachycardic with lower abd tenderness most focal the rlq and suprapubic region, guarding toward the suprapubic /rlq region  No cva tenderness  Admission work-up showing leukocytosis & perforated sigmoid diverticulitis with intra-abdominal abscess and mild right hydroureter on imaging  Admitted to inpatient status for diverticulitis with abscess, NPO with IVF, started on IVABT, IR & urology consulted      ED Triage Vitals   Temperature Pulse Respirations Blood Pressure SpO2   01/18/21 1256 01/18/21 1256 01/18/21 1259 01/18/21 1256 01/18/21 1256   98 2 °F (36 8 °C) (!) 117 18 138/92 98 %      Temp Source Heart Rate Source Patient Position - Orthostatic VS BP Location FiO2 (%) 01/18/21 1256 01/18/21 1256 01/18/21 1256 01/18/21 1256 --   Temporal Monitor Lying Left arm       Pain Score       01/18/21 1256       5          Wt Readings from Last 1 Encounters:   01/18/21 79 3 kg (174 lb 13 2 oz)     Additional Vital Signs:   01/18/21 2156  98 1 °F (36 7 °C)  --  --  --  --  --  --  --   01/18/21 2051  --  85  16  --  --  97 %  None (Room air)  --   01/18/21 2047  --   --  --  --  --  --  --  --   Temp: Gave pt acetaminophen (TYLENOL) tablet 650 mg at 01/18/21 2047 01/18/21 1909  101 5 °F (38 6 °C)Abnormal   106Abnormal   16  146/77  --  95 %  None (Room air)  Lying   01/18/21 1854  100 9 °F (38 3 °C)Abnormal   108Abnormal   18  164/83  --  96 %  --  --   01/18/21 1745  --  102  --  --  --  89 %Abnormal   --  --   01/18/21 1715  --  94  --  --  --  93 %  --  --   01/18/21 1700  --  108Abnormal   --  --  --  96 %  --  --     Pertinent Labs/Diagnostic Test Results:   Results from last 7 days   Lab Units 01/18/21  1320   SARS-COV-2  Negative     Results from last 7 days   Lab Units 01/19/21  0521 01/18/21  1320   WBC Thousand/uL 10 10 14 30*   HEMOGLOBIN g/dL 12 5* 13 5*   HEMATOCRIT % 38 0* 41 1*   PLATELETS Thousands/uL 179 216   NEUTROS ABS Thousands/µL 8 50* 11 60*     Results from last 7 days   Lab Units 01/19/21  0521 01/18/21  1320   SODIUM mmol/L 139 138   POTASSIUM mmol/L 3 7 3 9   CHLORIDE mmol/L 105 102   CO2 mmol/L 23 25   ANION GAP mmol/L 11 11   BUN mg/dL 25 19   CREATININE mg/dL 1 60* 1 22   EGFR ml/min/1 73sq m 44 61   CALCIUM mg/dL 8 7 9 8   MAGNESIUM mg/dL 1 8*  --    PHOSPHORUS mg/dL 3 4  --      Results from last 7 days   Lab Units 01/18/21  1320   AST U/L 12*   ALT U/L 13   ALK PHOS U/L 60   TOTAL PROTEIN g/dL 7 6   ALBUMIN g/dL 4 0   TOTAL BILIRUBIN mg/dL 1 00     Results from last 7 days   Lab Units 01/19/21  0521 01/18/21  1320   GLUCOSE RANDOM mg/dL 113* 115*     Results from last 7 days   Lab Units 01/18/21  1320   PROTIME seconds 13 9   INR  1 08   PTT seconds 31 Results from last 7 days   Lab Units 01/18/21  1320   LACTIC ACID mmol/L 1 5     Results from last 7 days   Lab Units 01/18/21  1320   LIPASE u/L <10*     Results from last 7 days   Lab Units 01/18/21  1432   CLARITY UA  Clear   COLOR UA  Yellow   SPEC GRAV UA  1 015   PH UA  6 0   GLUCOSE UA mg/dl Negative   KETONES UA mg/dl Negative   BLOOD UA  2+*   PROTEIN UA mg/dl 1+*   NITRITE UA  Negative   BILIRUBIN UA  Negative   UROBILINOGEN UA E U /dl 0 2   LEUKOCYTES UA  Negative   WBC UA /hpf 1-2   RBC UA /hpf 1-2   BACTERIA UA /hpf Occasional   EPITHELIAL CELLS WET PREP /hpf Occasional     Results from last 7 days   Lab Units 01/18/21  1320   INFLUENZA A PCR  Negative   INFLUENZA B PCR  Negative   RSV PCR  Negative     Results from last 7 days   Lab Units 01/18/21  1320   BLOOD CULTURE  Received in Microbiology Lab  Culture in Progress  Received in Microbiology Lab  Culture in Progress  1/18  Ct a/p=  1  Infiltrative changes related to the mid sigmoid colon on the right suspicious for diverticulitis  Associated partially loculated fluid collection here suspicious for developing abscess  This measures up to 5 6 cm   2   Mild right-sided hydroureter down to the level of the sigmoid inflammation  3   Possible pancreatic nodule measuring up to 1 7 cm  Recommend follow-up with dedicated MRI of the pancreas with and without IV contrast on a nonemergent basis      ED Treatment:   Medication Administration from 01/18/2021 1230 to 01/18/2021 1909       Date/Time Order Dose Route Action     01/18/2021 1426 sodium chloride 0 9 % bolus 1,000 mL 1,000 mL Intravenous New Bag     01/18/2021 1528 iohexol (OMNIPAQUE) 350 MG/ML injection (SINGLE-DOSE) 100 mL 100 mL Intravenous Given     01/18/2021 1655 piperacillin-tazobactam (ZOSYN) IVPB 3 375 g 0 g Intravenous Override Pull     01/18/2021 1651 piperacillin-tazobactam (ZOSYN) IVPB 3 375 g 3 375 g Intravenous New Bag     01/18/2021 1849 acetaminophen (TYLENOL) tablet 975 mg 975 mg Oral Given        Past Medical History:   Diagnosis Date    Hypertension     Left bundle branch block      Present on Admission:   Diverticulitis of large intestine with abscess   Hypertension    Admitting Diagnosis: Abdominal pain [R10 9]  Fever [R50 9]  Diverticulitis of intestine with abscess [K57 80]  Age/Sex: 78 y o  male  Admission Orders:  Contact & airborne isolation  Incentive spirometry  Consult IR  Consult urology  Urinary retention protocol  NPO    Scheduled Medications:  heparin (porcine), 5,000 Units, Subcutaneous, Q12H [address]  losartan, 100 mg, Oral, Daily  pantoprazole, 40 mg, Oral, Early Morning  piperacillin-tazobactam, 3 375 g, Intravenous, Q6H    Continuous IV Infusions:  lactated ringers, 125 mL/hr, Intravenous, Continuous    PRN Meds:  acetaminophen, 650 mg, Oral, Q4H PRN  ondansetron, 4 mg, Intravenous, Q4H PRN    Network Utilization Review Department  ATTENTION: Please call with any questions or concerns to [phone] and carefully listen to the prompts so that you are directed to the right person  All voicemails are confidential   Diandra Kelsey all requests for admission clinical reviews, approved or denied determinations and any other requests to dedicated fax number below belonging to the campus where the patient is receiving treatment   List of dedicated fax numbers for the Facilities:  [address] DENIALS (Administrative/Medical Necessity) [phone]   [address] (Maternity/NICU/Pediatrics) [phone]   [address] Serafin Escalona 9931 (  [facility]) [address] [facility] 28 [phone] Kylah Paredes 37 [address] [address] [phone]

## 2021-01-19 NOTE — PROGRESS NOTES
Progress Note Achilles Pulling 1950, 78 y o  male MRN: [id number]    Unit/Bed#: -01 Encounter: [id number]    Primary Care Provider: Devin Manriquez DO   Date and time admitted to hospital: 1/18/2021 12:37 PM        * Diverticulitis of large intestine with abscess  [address] [facility] day 1 with acute sigmoid diverticulitis complicated by intra-abdominal abscess  Patient reports feeling well  He denies all complaints referable to his abdomen  Physical examination:  Vital signs low-grade temperature 100 1° all other vital signs stable  In general he is a well-nourished well-appearing male in no acute distress awake alert oriented  Abdomen soft and nontender to palpation in 4 quadrants  No guarding rebound or peritoneal signs  Plan:  1  Percutaneous drainage of diverticular abscess by Interventional Radiology  2  Advance to clear liquid diet  3  Urology consultation  4  Continued parental antibiotics  5  Toradol discontinued secondary to elevation of creatinine  Subjective/Objective       Blood pressure 138/66, pulse 88, temperature 100 5 °F (38 1 °C), temperature source Tympanic, resp  rate 18, height 5' 7" (1 702 m), weight 68 2 kg (150 lb 5 7 oz), SpO2 96 %  ,Body mass index is 23 55 kg/m²  Intake/Output Summary (Last 24 hours) at 1/19/2021 1400  Last data filed at 1/19/2021 1300  Gross per 24 hour   Intake 2580 ml   Output 300 ml   Net 2280 ml       Invasive Devices     Peripheral Intravenous Line            Peripheral IV 01/18/21 Right Antecubital 1 day                Lab, Imaging and other studies:I have personally reviewed pertinent lab results      VTE Pharmacologic Prophylaxis: Heparin

## 2021-01-19 NOTE — ED CARE HANDOFF
Emergency Department Sign Out Note        Sign out and transfer of care from Dr Mray Ellen Post  See Separate Emergency Department note  The patient, Gregg Kidd, was evaluated by the previous provider for abdominal pain       Workup Completed:    Patient was evaluated, and found to have evidence of a diverticulitis with abscess  Patient was also found to have evidence of hydronephrosis due to mass effect from the abscess  ED Course / Workup Pending (followup):  Dr Reyes Barrs has evaluated the patient in the ED and will admit the patient to his service  He will consult urology for further evaluation of the hydronephrosis  Procedures  MDM    Disposition  Final diagnoses:   Diverticulitis of intestine with abscess     Time reflects when diagnosis was documented in both MDM as applicable and the Disposition within this note     Time User Action Codes Description Comment    1/18/2021  6:44 PM Ronaldo Hinton Add [K57 80] Diverticulitis of intestine with abscess     1/18/2021  6:44 PM Livier Aguilera Add [N13 5] Ureteral obstruction, left     1/18/2021  6:45 PM Livier Aguilera Remove [N13 5] Ureteral obstruction, left     1/18/2021  8:05 PM Janessayou Pruitt Add [N13 4] Hydroureter on right       ED Disposition     ED Disposition Condition Date/Time Comment    Admit Stable Mon Jan 18, 2021  6:44 PM Case was discussed with Dr Reyes Barrs and the patient's admission status was agreed to be Admission Status: inpatient status to the service of Dr Reyes Barrs           Follow-up Information    None       Current Discharge Medication List      CONTINUE these medications which have NOT CHANGED    Details   acetaminophen (TYLENOL) 500 mg tablet Take 500 mg by mouth every 6 (six) hours as needed for mild pain      ibuprofen (MOTRIN) 600 mg tablet Take 1 tablet (600 mg total) by mouth every 6 (six) hours as needed for mild pain  Qty: 30 tablet, Refills: 0    Associated Diagnoses: Sternum pain      irbesartan (AVAPRO) 300 mg tablet Take 300 mg by mouth daily           No discharge procedures on file         ED Provider  Electronically Signed by     Robbie Fields DO  01/18/21 4763

## 2021-01-19 NOTE — UTILIZATION REVIEW
Notification of Inpatient Admission/Inpatient Authorization Request   This is a Notification of Inpatient Admission for [address] [facility] [address]  Be advised that this patient was admitted to our facility under Inpatient Status  Contact Dorys Bartlett at [phone] for additional admission information  [facility]  DEDICATED [phone]  Patient Name:   Triny Warren   YOB: 1950       [address]   [address]:   [address]  Tax ID: [id number]  NPI: [id number] Attending Provider/NPI:  Phone:  Address: Denise Martinez, Lila Montaño [[id number]]  [phone]  Same as POLINA/Bryant Rizzo 1106 of Service Code: 24 Place of Service Name: [address] [place] [address]   Start Date: 1/18/21 1845 Discharge Date & Time: No discharge date for patient encounter  Type of Admission: Inpatient Status Discharge Disposition   (if discharged): Home/Self Care   Patient Diagnoses: Abdominal pain [R10 9]  Fever [R50 9]  Diverticulitis of intestine with abscess [K57 80]     Orders: Admission Orders (From admission, onward)     Ordered        01/18/21 1845  Inpatient Admission  Once                    Assigned Utilization Review Contact: Elizabeth Vallecillo  Utilization   Network Utilization Review Department  Phone: [phone]; Fax [phone]  Email: [email or web address]   ATTENTION PAYERS: Please call the assigned Utilization  directly with any questions or concerns ALL voicemails in the department are confidential  Send all requests for admission clinical reviews, approved or denied determinations and any other requests to dedicated fax number belonging to the campus where the patient is receiving treatment

## 2021-01-19 NOTE — CONSULTS
Interventional Radiology  Consultation 1/19/2021     Victor Manuel Pavno Shannon   1950   [id number]      Assessment/Plan:  Percutaneous placement of abscess drainage with CT guidance and caudal gantry tilt  Problem List     * (Principal) Diverticulitis of large intestine with abscess    Hypertension            Subjective:     Patient ID: Taty Muñoz is a 78 y o  male  History of Present Illness  Recently admitted  CT scan shows abscess in the pelvis  This is near a surgical staple line      Review of Systems      Past Medical History:   Diagnosis Date    Hypertension     Left bundle branch block         Past Surgical History:   Procedure Laterality Date    APPENDECTOMY      BACK SURGERY      TONSILLECTOMY          Social History     Tobacco Use   Smoking Status Never Smoker   Smokeless Tobacco Never Used        Social History     Substance and Sexual Activity   Alcohol Use Yes    Frequency: Monthly or less    Drinks per session: 1 or 2    Binge frequency: Never        Social History     Substance and Sexual Activity   Drug Use Never        Allergies   Allergen Reactions    Hydrocodone-Acetaminophen Other (See Comments)     disoriented       Current Facility-Administered Medications   Medication Dose Route Frequency Provider Last Rate Last Admin    acetaminophen (TYLENOL) tablet 650 mg  650 mg Oral Q4H PRN Everette Ann MD   650 mg at 01/19/21 1444    heparin (porcine) subcutaneous injection 5,000 Units  5,000 Units Subcutaneous Q12H [address] Everette Ann MD   5,000 Units at 01/18/21 2047    lactated ringers infusion  150 mL/hr Intravenous Continuous Everette Ann  mL/hr at 01/19/21 1421 150 mL/hr at 01/19/21 1421    losartan (COZAAR) tablet 100 mg  100 mg Oral Daily Everette Ann MD   Stopped at 01/19/21 5256    magnesium sulfate 2 g/50 mL IVPB (premix) 2 g  2 g Intravenous Once Everette Ann MD   2 g at 01/19/21 1431    ondansetron (ZOFRAN) injection 4 mg  4 mg Intravenous Q4H PRN Justino Gonzales MD        pantoprazole (PROTONIX) EC tablet 40 mg  40 mg Oral Early Morning Justino Gonzales MD   40 mg at 01/19/21 0522    piperacillin-tazobactam (ZOSYN) 3 375 g in sodium chloride 0 9 % 100 mL IVPB  3 375 g Intravenous Q6H Justino Gonzales  mL/hr at 01/19/21 1050 3 375 g at 01/19/21 1050          Objective:    Vitals:    01/18/21 2300 01/19/21 0737 01/19/21 0840 01/19/21 1444   BP: 115/60 138/66     BP Location: Left arm Left arm     Pulse: 83 88     Resp: 18 18     Temp: 97 9 °F (36 6 °C) 100 5 °F (38 1 °C)  (!) 102 1 °F (38 9 °C)   TempSrc: Temporal Tympanic  Tympanic   SpO2: 97% 96%     Weight:   68 2 kg (150 lb 5 7 oz)    Height:            Physical Exam      I spent 5 minutes communicating with the clinical team, to coordinate the procedure  No results found for: BNP   Lab Results   Component Value Date    WBC 10 10 01/19/2021    HGB 12 5 (L) 01/19/2021    HCT 38 0 (L) 01/19/2021    MCV 84 01/19/2021     01/19/2021     Lab Results   Component Value Date    INR 1 08 01/18/2021    PROTIME 13 9 01/18/2021     Lab Results   Component Value Date    PTT 31 01/18/2021         I have personally reviewed pertinent imaging and laboratory results  Code Status: No Order  Advance Directive and Living Will:      Power of :    POLST:      IR has been consulted to evaluate the patient, determine the appropriate procedure, and whether or not a procedure can and should be performed  Thank you for allowing me to participate in the care of Kylah Womack  Please don't hesitate to call, text, email, or Tiger[employer] with any questions  This text is generated with voice recognition software  There may be translation, syntax,  or grammatical errors  If you have any questions, please contact the dictating provider

## 2021-01-20 ENCOUNTER — APPOINTMENT (INPATIENT)
Dept: ULTRASOUND IMAGING | Facility: HOSPITAL | Age: 71
DRG: 392 | End: 2021-01-20
Payer: COMMERCIAL

## 2021-01-20 LAB
AMORPH URATE CRY URNS QL MICRO: ABNORMAL /HPF
ANION GAP SERPL CALCULATED.3IONS-SCNC: 8 MMOL/L (ref 4–13)
BACTERIA UR QL AUTO: ABNORMAL /HPF
BASOPHILS # BLD AUTO: 0 THOUSANDS/ΜL (ref 0–0.1)
BASOPHILS NFR BLD AUTO: 0 % (ref 0–2)
BILIRUB UR QL STRIP: NEGATIVE
BUN SERPL-MCNC: 28 MG/DL (ref 7–25)
CALCIUM SERPL-MCNC: 8.4 MG/DL (ref 8.6–10.5)
CHLORIDE SERPL-SCNC: 105 MMOL/L (ref 98–107)
CLARITY UR: CLEAR
CO2 SERPL-SCNC: 25 MMOL/L (ref 21–31)
COLOR UR: YELLOW
CREAT SERPL-MCNC: 2.28 MG/DL (ref 0.7–1.3)
CREAT UR-MCNC: 67.7 MG/DL
EOSINOPHIL # BLD AUTO: 0 THOUSAND/ΜL (ref 0–0.61)
EOSINOPHIL NFR BLD AUTO: 0 % (ref 0–5)
ERYTHROCYTE [DISTWIDTH] IN BLOOD BY AUTOMATED COUNT: 14.4 % (ref 11.5–14.5)
GFR SERPL CREATININE-BSD FRML MDRD: 28 ML/MIN/1.73SQ M
GLUCOSE SERPL-MCNC: 114 MG/DL (ref 65–99)
GLUCOSE UR STRIP-MCNC: NEGATIVE MG/DL
HCT VFR BLD AUTO: 32.4 % (ref 42–47)
HGB BLD-MCNC: 10.6 G/DL (ref 14–18)
HGB UR QL STRIP.AUTO: ABNORMAL
KETONES UR STRIP-MCNC: NEGATIVE MG/DL
LEUKOCYTE ESTERASE UR QL STRIP: NEGATIVE
LYMPHOCYTES # BLD AUTO: 0.7 THOUSANDS/ΜL (ref 0.6–4.47)
LYMPHOCYTES NFR BLD AUTO: 10 % (ref 21–51)
MAGNESIUM SERPL-MCNC: 2 MG/DL (ref 1.9–2.7)
MCH RBC QN AUTO: 27.4 PG (ref 26–34)
MCHC RBC AUTO-ENTMCNC: 32.7 G/DL (ref 31–37)
MCV RBC AUTO: 84 FL (ref 81–99)
MONOCYTES # BLD AUTO: 0.7 THOUSAND/ΜL (ref 0.17–1.22)
MONOCYTES NFR BLD AUTO: 10 % (ref 2–12)
NEUTROPHILS # BLD AUTO: 5.6 THOUSANDS/ΜL (ref 1.4–6.5)
NEUTS SEG NFR BLD AUTO: 79 % (ref 42–75)
NITRITE UR QL STRIP: NEGATIVE
NON-SQ EPI CELLS URNS QL MICRO: ABNORMAL /HPF
PH UR STRIP.AUTO: 6 [PH]
PHOSPHATE SERPL-MCNC: 4.1 MG/DL (ref 3–5.5)
PLATELET # BLD AUTO: 157 THOUSANDS/UL (ref 149–390)
PMV BLD AUTO: 9.6 FL (ref 8.6–11.7)
POTASSIUM SERPL-SCNC: 4 MMOL/L (ref 3.5–5.5)
PROT UR STRIP-MCNC: NEGATIVE MG/DL
RBC # BLD AUTO: 3.86 MILLION/UL (ref 4.3–5.9)
RBC #/AREA URNS AUTO: ABNORMAL /HPF
SODIUM 24H UR-SCNC: 15 MOL/L
SODIUM SERPL-SCNC: 138 MMOL/L (ref 134–143)
SP GR UR STRIP.AUTO: <=1.005 (ref 1–1.03)
UROBILINOGEN UR QL STRIP.AUTO: 0.2 E.U./DL
WBC # BLD AUTO: 7.1 THOUSAND/UL (ref 4.8–10.8)
WBC #/AREA URNS AUTO: ABNORMAL /HPF

## 2021-01-20 PROCEDURE — 84300 ASSAY OF URINE SODIUM: CPT | Performed by: NURSE PRACTITIONER

## 2021-01-20 PROCEDURE — 80048 BASIC METABOLIC PNL TOTAL CA: CPT | Performed by: SURGERY

## 2021-01-20 PROCEDURE — 82570 ASSAY OF URINE CREATININE: CPT | Performed by: NURSE PRACTITIONER

## 2021-01-20 PROCEDURE — 99223 1ST HOSP IP/OBS HIGH 75: CPT | Performed by: NURSE PRACTITIONER

## 2021-01-20 PROCEDURE — 81001 URINALYSIS AUTO W/SCOPE: CPT | Performed by: PHYSICIAN ASSISTANT

## 2021-01-20 PROCEDURE — 84100 ASSAY OF PHOSPHORUS: CPT | Performed by: SURGERY

## 2021-01-20 PROCEDURE — 76770 US EXAM ABDO BACK WALL COMP: CPT

## 2021-01-20 PROCEDURE — 85025 COMPLETE CBC W/AUTO DIFF WBC: CPT | Performed by: SURGERY

## 2021-01-20 PROCEDURE — 99233 SBSQ HOSP IP/OBS HIGH 50: CPT | Performed by: PHYSICIAN ASSISTANT

## 2021-01-20 PROCEDURE — 83735 ASSAY OF MAGNESIUM: CPT | Performed by: SURGERY

## 2021-01-20 RX ORDER — CIPROFLOXACIN 2 MG/ML
400 INJECTION, SOLUTION INTRAVENOUS EVERY 12 HOURS
Status: DISCONTINUED | OUTPATIENT
Start: 2021-01-20 | End: 2021-01-22 | Stop reason: HOSPADM

## 2021-01-20 RX ADMIN — HEPARIN SODIUM 5000 UNITS: 5000 INJECTION INTRAVENOUS; SUBCUTANEOUS at 08:53

## 2021-01-20 RX ADMIN — CIPROFLOXACIN 400 MG: 2 INJECTION, SOLUTION INTRAVENOUS at 21:18

## 2021-01-20 RX ADMIN — PIPERACILLIN SODIUM AND TAZOBACTAM SODIUM 3.38 G: 3; .375 INJECTION, POWDER, LYOPHILIZED, FOR SOLUTION INTRAVENOUS at 05:31

## 2021-01-20 RX ADMIN — ACETAMINOPHEN 650 MG: 325 TABLET ORAL at 00:55

## 2021-01-20 RX ADMIN — SODIUM CHLORIDE, SODIUM LACTATE, POTASSIUM CHLORIDE, AND CALCIUM CHLORIDE 150 ML/HR: .6; .31; .03; .02 INJECTION, SOLUTION INTRAVENOUS at 17:44

## 2021-01-20 RX ADMIN — PANTOPRAZOLE SODIUM 40 MG: 40 TABLET, DELAYED RELEASE ORAL at 05:32

## 2021-01-20 RX ADMIN — SODIUM CHLORIDE, SODIUM LACTATE, POTASSIUM CHLORIDE, AND CALCIUM CHLORIDE 150 ML/HR: .6; .31; .03; .02 INJECTION, SOLUTION INTRAVENOUS at 01:10

## 2021-01-20 RX ADMIN — ACETAMINOPHEN 650 MG: 325 TABLET ORAL at 21:17

## 2021-01-20 RX ADMIN — SODIUM CHLORIDE, SODIUM LACTATE, POTASSIUM CHLORIDE, AND CALCIUM CHLORIDE 150 ML/HR: .6; .31; .03; .02 INJECTION, SOLUTION INTRAVENOUS at 09:00

## 2021-01-20 RX ADMIN — HEPARIN SODIUM 5000 UNITS: 5000 INJECTION INTRAVENOUS; SUBCUTANEOUS at 21:16

## 2021-01-20 RX ADMIN — METRONIDAZOLE 500 MG: 500 SOLUTION INTRAVENOUS at 11:55

## 2021-01-20 RX ADMIN — ACETAMINOPHEN 650 MG: 325 TABLET ORAL at 07:43

## 2021-01-20 RX ADMIN — LOSARTAN POTASSIUM 100 MG: 50 TABLET, FILM COATED ORAL at 08:55

## 2021-01-20 RX ADMIN — METRONIDAZOLE 500 MG: 500 SOLUTION INTRAVENOUS at 20:35

## 2021-01-20 RX ADMIN — ACETAMINOPHEN 650 MG: 325 TABLET ORAL at 16:55

## 2021-01-20 RX ADMIN — CIPROFLOXACIN 400 MG: 2 INJECTION, SOLUTION INTRAVENOUS at 10:20

## 2021-01-20 NOTE — ASSESSMENT & PLAN NOTE
Pleasant 79 yo M HD#3 POD#1 s/p percutaneous drain placement for diverticular abscess and secondary right hydroureter  Reports improvement of pain, return of appetite, having BMs and passing flatus  Well appearing, abdomen soft, mild TTP in lower abdomen, drain with serosanguinous  T  99 3, Tmax 102 1, HR 72, RR 20, /61, SpO2 95% on room air  BUN 28, Cr 2 28 (1 60), Hgb 10 6 (12 5), WBC 7 1  Fluid cultures pending  Assessment:  Acute sigmoid diverticulitis  Diverticular abscess  Right hydroureter  Acute kidney injury  Anemia  Plan:  Nephrology consulted  Urology following  Continue LR @150  Repeat CBC/BMP in AM   Transition from Zosyn to Cipro/Flagyl  Monitor temperature

## 2021-01-20 NOTE — CONSULTS
Consultation - Urology   Domi Gibson 78 y o  male MRN: [id number]  Unit/Bed#: -01 Encounter: [id number]      Assessment/Plan      Assessment:  Right hydroureteronephrosis secondary to extrinsic compression from abscess from perforated sigmoid diverticulitis  Progressive AMARIS despite drainage of the abscess percutaneously yesterday  Plan:  Obtain renal ultrasound to evaluate degree of hydronephrosis  Follow renal function and consider percutaneous nephrostomy if necessary  I will continue to follow with further recommendations  History of Present Illness   Attending: Poppy Lin MD  Reason for Consult / Principal Problem:  Right hydronephrosis  HPI: Domi Gibson is a 78y o  year old male who presents with right lower quadrant abscess secondary to perforated sigmoid diverticulitis associated with mild right hydroureteronephrosis on initial CT scan  He underwent percutaneous drainage of the abscess yesterday  Creatinine has been worsening since admission and today is 2 28  He has been voiding well without any urinary symptoms  Bladder scan PVR was only 26 mL  He denies any upper abdominal or flank pain  He denies any past history of urinary tract infection or kidney stones  Consults    Review of Systems   Gastrointestinal: Negative for abdominal distention and abdominal pain  Genitourinary: Negative for difficulty urinating, dysuria, flank pain, frequency and hematuria         Historical Information   Past Medical History:   Diagnosis Date    Hypertension     Left bundle branch block      Past Surgical History:   Procedure Laterality Date    APPENDECTOMY      BACK SURGERY      TONSILLECTOMY       Social History   Social History     Substance and Sexual Activity   Alcohol Use Yes    Frequency: Monthly or less    Drinks per session: 1 or 2    Binge frequency: Never     @DRUGHX  E-Cigarette/Vaping    E-Cigarette Use Never User      E-Cigarette/Vaping Substances     Social History     Tobacco Use   Smoking Status Never Smoker   Smokeless Tobacco Never Used     Family History: non-contributory    Meds/Allergies   current meds:   Current Facility-Administered Medications   Medication Dose Route Frequency    acetaminophen (TYLENOL) tablet 650 mg  650 mg Oral Q4H PRN    heparin (porcine) subcutaneous injection 5,000 Units  5,000 Units Subcutaneous Q12H [address]    lactated ringers infusion  150 mL/hr Intravenous Continuous    losartan (COZAAR) tablet 100 mg  100 mg Oral Daily    meperidine (DEMEROL) injection 25 mg  25 mg Intravenous Once PRN    ondansetron (ZOFRAN) injection 4 mg  4 mg Intravenous Q4H PRN    pantoprazole (PROTONIX) EC tablet 40 mg  40 mg Oral Early Morning    piperacillin-tazobactam (ZOSYN) 3 375 g in sodium chloride 0 9 % 100 mL IVPB  3 375 g Intravenous Q6H     Allergies   Allergen Reactions    Hydrocodone-Acetaminophen Other (See Comments)     disoriented       Objective   Vitals: Blood pressure 125/61, pulse 72, temperature 99 3 °F (37 4 °C), temperature source Temporal, resp  rate 20, height 5' 7" (1 702 m), weight 82 2 kg (181 lb 5 3 oz), SpO2 95 %  I/O last 24 hours: In: 9823 [P O :1220; I V :2043]  Out: 265 [Urine:225; Drains:40]    Invasive Devices     Peripheral Intravenous Line            Peripheral IV 01/18/21 Right Antecubital 1 day          Drain            Closed/Suction Drain Midline Groin Bulb 10 2 Fr  less than 1 day                Physical Exam  Abdominal:      General: There is no distension  Palpations: Abdomen is soft  Tenderness: There is no abdominal tenderness  Genitourinary:     Penis: Normal       Normal testicles and spermatic cords bilaterally    Digital rectal exam reveals prostate to be moderately enlarged, firm, smooth nontender without masses    Lab Results:   CBC:   Lab Results   Component Value Date    WBC 7 10 01/20/2021    HGB 10 6 (L) 01/20/2021    HCT 32 4 (L) 01/20/2021    MCV 84 01/20/2021     01/20/2021 MCH 27 4 01/20/2021    MCHC 32 7 01/20/2021    RDW 14 4 01/20/2021    MPV 9 6 01/20/2021     CMP:   Lab Results   Component Value Date    SODIUM 138 01/20/2021     01/20/2021    CO2 25 01/20/2021    BUN 28 (H) 01/20/2021    CREATININE 2 28 (H) 01/20/2021    CALCIUM 8 4 (L) 01/20/2021    EGFR 28 01/20/2021     Urinalysis:   Lab Results   Component Value Date    COLORU Yellow 01/19/2021    CLARITYU Clear 01/19/2021    SPECGRAV 1 020 01/19/2021    PHUR 5 5 01/19/2021    LEUKOCYTESUR Negative 01/19/2021    NITRITE Negative 01/19/2021    GLUCOSEU Negative 01/19/2021    KETONESU Trace (A) 01/19/2021    BILIRUBINUR 1+ (A) 01/19/2021    BLOODU Trace-lysed (A) 01/19/2021     Urine Culture: No results found for: URINECX  Imaging Studies: I have personally reviewed pertinent reports  EKG, Pathology, and Other Studies: I have personally reviewed pertinent reports  VTE Prophylaxis: Sequential compression device Murrel Pat)     Code Status: No Order  Advance Directive and Living Will:      Power of :    POLST:      Counseling / Coordination of Care  Total floor / unit time spent today 30 minutes  Greater than 50% of total time was spent with the patient and / or family counseling and / or coordination of care  A description of the counseling / coordination of care:  I have discussed the case with Dr Yordy Ordonez

## 2021-01-20 NOTE — PROGRESS NOTES
Progress Note Jesus Preciado 1950, 78 y o  male MRN: [id number]    Unit/Bed#: -01 Encounter: [id number]    Primary Care Provider: Mark Hassan DO   Date and time admitted to hospital: 1/18/2021 12:37 PM        * Diverticulitis of large intestine with abscess  Assessment & Plan  Pleasant 79 yo M HD#3 POD#1 s/p percutaneous drain placement for diverticular abscess and secondary right hydroureter  Reports improvement of pain, return of appetite, having BMs and passing flatus  Well appearing, abdomen soft, mild TTP in lower abdomen, drain with serosanguinous  T  99 3, Tmax 102 1, HR 72, RR 20, /61, SpO2 95% on room air  BUN 28, Cr 2 28 (1 60), Hgb 10 6 (12 5), WBC 7 1  Fluid cultures pending  Assessment:  Acute sigmoid diverticulitis  Diverticular abscess  Right hydroureter  Acute kidney injury  Anemia  Plan:  Nephrology consulted  Urology following  Continue LR @150  Repeat CBC/BMP in AM   Transition from Zosyn to Cipro/Flagyl  Monitor temperature  Subjective/Objective   Chief Complaint: "I feel better"    Subjective: Reports decreased pain  Improved appetite  No nausea or vomiting  Passing BM and flatus  Reports feeling thirsty/dehydrated last night which has improved  Objective: IR drain placed yesterday  Blood pressure 125/61, pulse 72, temperature 99 3 °F (37 4 °C), temperature source Temporal, resp  rate 20, height 5' 7" (1 702 m), weight 82 2 kg (181 lb 5 3 oz), SpO2 95 %  ,Body mass index is 28 4 kg/m²  Intake/Output Summary (Last 24 hours) at 1/20/2021 1026  Last data filed at 1/20/2021 0900  Gross per 24 hour   Intake 4023 ml   Output 165 ml   Net 3858 ml       Invasive Devices     Peripheral Intravenous Line            Peripheral IV 01/20/21 Dorsal (posterior); Right Forearm less than 1 day          Drain            Closed/Suction Drain Midline Groin Bulb 10 2 Fr  less than 1 day                Physical Exam  Vitals signs and nursing note reviewed  Constitutional:       General: He is not in acute distress  Appearance: He is well-developed  He is not diaphoretic  HENT:      Head: Normocephalic and atraumatic  Eyes:      Conjunctiva/sclera: Conjunctivae normal       Pupils: Pupils are equal, round, and reactive to light  Neck:      Musculoskeletal: Normal range of motion  Pulmonary:      Effort: No respiratory distress  Abdominal:      General: Abdomen is flat  Bowel sounds are normal  There is no distension  Palpations: Abdomen is soft  Tenderness: There is abdominal tenderness (mild lower abdominal tenderness)  There is no guarding or rebound  Comments: Pigtail catheter in suprapubic region draining serosanguinous fluid  Musculoskeletal: Normal range of motion  Skin:     General: Skin is warm and dry  Capillary Refill: Capillary refill takes less than 2 seconds  Neurological:      Mental Status: He is alert and oriented to person, place, and time  Psychiatric:         Behavior: Behavior normal            Lab, Imaging and other studies:  I have personally reviewed pertinent lab results    , CBC:   Lab Results   Component Value Date    WBC 7 10 01/20/2021    HGB 10 6 (L) 01/20/2021    HCT 32 4 (L) 01/20/2021    MCV 84 01/20/2021     01/20/2021    MCH 27 4 01/20/2021    MCHC 32 7 01/20/2021    RDW 14 4 01/20/2021    MPV 9 6 01/20/2021   , CMP:   Lab Results   Component Value Date    SODIUM 138 01/20/2021    K 4 0 01/20/2021     01/20/2021    CO2 25 01/20/2021    BUN 28 (H) 01/20/2021    CREATININE 2 28 (H) 01/20/2021    CALCIUM 8 4 (L) 01/20/2021    EGFR 28 01/20/2021     VTE Pharmacologic Prophylaxis: Heparin  VTE Mechanical Prophylaxis: sequential compression device

## 2021-01-20 NOTE — NURSING NOTE
Awake and oriented x4  Pleasant  Denies pain  Temp 101, and medicated tylenol 2 tabs  ivf continue  No shivering/shakes noted  indy maintained rt lq for sero/sang return  Small amt  Call bell given  voids freely

## 2021-01-20 NOTE — PLAN OF CARE
Problem: Potential for Falls  Goal: Patient will remain free of falls  Description: INTERVENTIONS:  - Assess patient frequently for physical needs  -  Identify cognitive and physical deficits and behaviors that affect risk of falls    -  [place] fall precautions as indicated by assessment   - Educate patient/family on patient safety including physical limitations  - Instruct patient to call for assistance with activity based on assessment  - Modify environment to reduce risk of injury  - Consider OT/PT consult to assist with strengthening/mobility  Outcome: Progressing     Problem: PAIN - ADULT  Goal: Verbalizes/displays adequate comfort level or baseline comfort level  Description: Interventions:  - Encourage patient to monitor pain and request assistance  - Assess pain using appropriate pain scale  - Administer analgesics based on type and severity of pain and evaluate response  - Implement non-pharmacological measures as appropriate and evaluate response  - Consider cultural and social influences on pain and pain management  - Notify physician/advanced practitioner if interventions unsuccessful or patient reports new pain  Outcome: Progressing     Problem: INFECTION - ADULT  Goal: Absence or prevention of progression during hospitalization  Description: INTERVENTIONS:  - Assess and monitor for signs and symptoms of infection  - Monitor lab/diagnostic results  - Monitor all insertion sites, i e  indwelling lines, tubes, and drains  - Monitor endotracheal if appropriate and nasal secretions for changes in amount and color  - [place] appropriate cooling/warming therapies per order  - Administer medications as ordered  - Instruct and encourage patient and family to use good hand hygiene technique  - Identify and instruct in appropriate isolation precautions for identified infection/condition  Outcome: Progressing  Goal: Absence of fever/infection during neutropenic period  Description: INTERVENTIONS:  - Monitor WBC    Outcome: Progressing     Problem: SAFETY ADULT  Goal: Maintain or return to baseline ADL function  Description: INTERVENTIONS:  -  Assess patient's ability to carry out ADLs; assess patient's baseline for ADL function and identify physical deficits which impact ability to perform ADLs (bathing, care of mouth/teeth, toileting, grooming, dressing, etc )  - Assess/evaluate cause of self-care deficits   - Assess range of motion  - Assess patient's mobility; develop plan if impaired  - Assess patient's need for assistive devices and provide as appropriate  - Encourage maximum independence but intervene and supervise when necessary  - Involve family in performance of ADLs  - Assess for home care needs following discharge   - Consider OT consult to assist with ADL evaluation and planning for discharge  - Provide patient education as appropriate  Outcome: Progressing  Goal: Maintain or return mobility status to optimal level  Description: INTERVENTIONS:  - Assess patient's baseline mobility status (ambulation, transfers, stairs, etc )    - Identify cognitive and physical deficits and behaviors that affect mobility  - Identify mobility aids required to assist with transfers and/or ambulation (gait belt, sit-to-stand, lift, walker, cane, etc )  - [place] fall precautions as indicated by assessment  - Record patient progress and toleration of activity level on Mobility SBAR; progress patient to next Phase/Stage  - Instruct patient to call for assistance with activity based on assessment  - Consider rehabilitation consult to assist with strengthening/weightbearing, etc   Outcome: Progressing     Problem: DISCHARGE PLANNING  Goal: Discharge to home or other facility with appropriate resources  Description: INTERVENTIONS:  - Identify barriers to discharge w/patient and caregiver  - Arrange for needed discharge resources and transportation as appropriate  - Identify discharge learning needs (meds, wound care, etc )  - Arrange for interpretive services to assist at discharge as needed  - Refer to Case Management Department for coordinating discharge planning if the patient needs post-hospital services based on physician/advanced practitioner order or complex needs related to functional status, cognitive ability, or social support system  Outcome: Progressing     Problem: Knowledge Deficit  Goal: Patient/family/caregiver demonstrates understanding of disease process, treatment plan, medications, and discharge instructions  Description: Complete learning assessment and assess knowledge base    Interventions:  - Provide teaching at level of understanding  - Provide teaching via preferred learning methods  Outcome: Progressing

## 2021-01-20 NOTE — NURSING NOTE
Pt transferred to [facility] med/surg room 209-2 after supper  All orders continued  Oriented to room, policy & procedures  Verbalized understanding  LR infusing @ 150cc/hr to #22 in Rt forearm  Afebrile, denies pain/discomfort  Ad deshaun in room w/o device

## 2021-01-20 NOTE — APP STUDENT NOTE
ABUNDIO STUDENT  Inpatient Progress Note for TRAINING ONLY  Not Part of Legal Medical Record       Progress Note - Mariela Ruggiero 78 y o  male MRN: [id number]    Unit/Bed#: -01 Encounter: [id number]      Assessment:  79 y/o well appearing male s/p percutaneous drainage for diverticular abscess    Plan:  Continue supportive therapy  Advance diet to surgical soft diet, low fiber/low residue  Continue ambulation and incentive spirometer  Repeat CBC and BMP tomorrow morning  Switch IV antibiotics to oral antibiotics  Discussed low fiber/low residue diet for the next 6 weeks  Continue to follow with IR for management of drain    Subjective:   Patient is a 79 y/o male 1 day s/p percutaneous drainage for diverticular abscess  Patient is feeling well overall and reports improved symptoms since drainage yesterday  He has not had solid food but has been tolerated liquids without N/V  He reports return of appetite and expresses a desire to advance his diet  His last BM was yesterday and he is passing flatus  He is urinating spontaneously and denies dysuria  He has no pain at rest  He has been using his incentive spirometer multiple times throughout the day  He denies fever, chills, lightheadedness, dizziness, chest pain or shortness of breath  Objective:     Vitals: Blood pressure 125/61, pulse 72, temperature 99 3 °F (37 4 °C), temperature source Temporal, resp  rate 20, height 5' 7" (1 702 m), weight 82 2 kg (181 lb 5 3 oz), SpO2 95 %  ,Body mass index is 28 4 kg/m²  Intake/Output Summary (Last 24 hours) at 1/20/2021 0828  Last data filed at 1/20/2021 7242  Gross per 24 hour   Intake 3263 ml   Output 265 ml   Net 2998 ml       Physical Exam  Vitals signs and nursing note reviewed  Constitutional:       Appearance: He is well-developed  HENT:      Head: Normocephalic and atraumatic  Eyes:      Conjunctiva/sclera: Conjunctivae normal    Neck:      Musculoskeletal: Neck supple     Cardiovascular:      Rate and Rhythm: Normal rate and regular rhythm  Heart sounds: No murmur  Pulmonary:      Effort: Pulmonary effort is normal  No respiratory distress  Breath sounds: Normal breath sounds  Abdominal:      General: Bowel sounds are normal       Palpations: Abdomen is soft  Tenderness: There is abdominal tenderness in the right lower quadrant and left lower quadrant  Comments: Mild tenderness to percussion and palpation in the RLQ and LLQ  MANAS drain in place and draining serous fluid  Musculoskeletal: Normal range of motion  Skin:     General: Skin is warm and dry  Neurological:      General: No focal deficit present  Mental Status: He is alert  Invasive Devices     Peripheral Intravenous Line            Peripheral IV 01/18/21 Right Antecubital 1 day          Drain            Closed/Suction Drain Midline Groin Bulb 10 2 Fr  less than 1 day                Lab, Imaging and other studies: I have personally reviewed pertinent reports      VTE Pharmacologic Prophylaxis: Heparin  VTE Mechanical Prophylaxis: sequential compression device
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
## 2021-01-21 LAB
ANION GAP SERPL CALCULATED.3IONS-SCNC: 10 MMOL/L (ref 4–13)
BUN SERPL-MCNC: 18 MG/DL (ref 7–25)
CALCIUM SERPL-MCNC: 8.5 MG/DL (ref 8.6–10.5)
CHLORIDE SERPL-SCNC: 108 MMOL/L (ref 98–107)
CO2 SERPL-SCNC: 22 MMOL/L (ref 21–31)
CREAT SERPL-MCNC: 1.35 MG/DL (ref 0.7–1.3)
ERYTHROCYTE [DISTWIDTH] IN BLOOD BY AUTOMATED COUNT: 14.4 % (ref 11.5–14.5)
GFR SERPL CREATININE-BSD FRML MDRD: 54 ML/MIN/1.73SQ M
GLUCOSE SERPL-MCNC: 90 MG/DL (ref 65–99)
HCT VFR BLD AUTO: 32.7 % (ref 42–47)
HGB BLD-MCNC: 10.7 G/DL (ref 14–18)
MAGNESIUM SERPL-MCNC: 2 MG/DL (ref 1.9–2.7)
MCH RBC QN AUTO: 27.2 PG (ref 26–34)
MCHC RBC AUTO-ENTMCNC: 32.6 G/DL (ref 31–37)
MCV RBC AUTO: 83 FL (ref 81–99)
PLATELET # BLD AUTO: 158 THOUSANDS/UL (ref 149–390)
PMV BLD AUTO: 9.5 FL (ref 8.6–11.7)
POTASSIUM SERPL-SCNC: 3.3 MMOL/L (ref 3.5–5.5)
RBC # BLD AUTO: 3.92 MILLION/UL (ref 4.3–5.9)
SODIUM SERPL-SCNC: 140 MMOL/L (ref 134–143)
WBC # BLD AUTO: 6.2 THOUSAND/UL (ref 4.8–10.8)

## 2021-01-21 PROCEDURE — 83735 ASSAY OF MAGNESIUM: CPT | Performed by: NURSE PRACTITIONER

## 2021-01-21 PROCEDURE — 85027 COMPLETE CBC AUTOMATED: CPT | Performed by: PHYSICIAN ASSISTANT

## 2021-01-21 PROCEDURE — 99232 SBSQ HOSP IP/OBS MODERATE 35: CPT | Performed by: NURSE PRACTITIONER

## 2021-01-21 PROCEDURE — 80048 BASIC METABOLIC PNL TOTAL CA: CPT | Performed by: PHYSICIAN ASSISTANT

## 2021-01-21 PROCEDURE — 99232 SBSQ HOSP IP/OBS MODERATE 35: CPT | Performed by: PHYSICIAN ASSISTANT

## 2021-01-21 RX ORDER — POTASSIUM CHLORIDE 20 MEQ/1
20 TABLET, EXTENDED RELEASE ORAL
Status: COMPLETED | OUTPATIENT
Start: 2021-01-21 | End: 2021-01-21

## 2021-01-21 RX ADMIN — POTASSIUM CHLORIDE 20 MEQ: 1500 TABLET, EXTENDED RELEASE ORAL at 16:10

## 2021-01-21 RX ADMIN — POTASSIUM CHLORIDE 20 MEQ: 1500 TABLET, EXTENDED RELEASE ORAL at 10:15

## 2021-01-21 RX ADMIN — METRONIDAZOLE 500 MG: 500 SOLUTION INTRAVENOUS at 11:30

## 2021-01-21 RX ADMIN — ACETAMINOPHEN 650 MG: 325 TABLET ORAL at 14:53

## 2021-01-21 RX ADMIN — SODIUM CHLORIDE, SODIUM LACTATE, POTASSIUM CHLORIDE, AND CALCIUM CHLORIDE 150 ML/HR: .6; .31; .03; .02 INJECTION, SOLUTION INTRAVENOUS at 04:22

## 2021-01-21 RX ADMIN — CIPROFLOXACIN 400 MG: 2 INJECTION, SOLUTION INTRAVENOUS at 21:45

## 2021-01-21 RX ADMIN — ONDANSETRON 4 MG: 2 INJECTION INTRAMUSCULAR; INTRAVENOUS at 20:22

## 2021-01-21 RX ADMIN — ACETAMINOPHEN 650 MG: 325 TABLET ORAL at 23:01

## 2021-01-21 RX ADMIN — METRONIDAZOLE 500 MG: 500 SOLUTION INTRAVENOUS at 19:37

## 2021-01-21 RX ADMIN — SODIUM CHLORIDE, SODIUM LACTATE, POTASSIUM CHLORIDE, AND CALCIUM CHLORIDE 75 ML/HR: .6; .31; .03; .02 INJECTION, SOLUTION INTRAVENOUS at 16:09

## 2021-01-21 RX ADMIN — HEPARIN SODIUM 5000 UNITS: 5000 INJECTION INTRAVENOUS; SUBCUTANEOUS at 21:44

## 2021-01-21 RX ADMIN — PANTOPRAZOLE SODIUM 40 MG: 40 TABLET, DELAYED RELEASE ORAL at 05:23

## 2021-01-21 RX ADMIN — HEPARIN SODIUM 5000 UNITS: 5000 INJECTION INTRAVENOUS; SUBCUTANEOUS at 09:23

## 2021-01-21 RX ADMIN — POTASSIUM CHLORIDE 20 MEQ: 1500 TABLET, EXTENDED RELEASE ORAL at 20:14

## 2021-01-21 RX ADMIN — METRONIDAZOLE 500 MG: 500 SOLUTION INTRAVENOUS at 03:36

## 2021-01-21 RX ADMIN — ACETAMINOPHEN 650 MG: 325 TABLET ORAL at 04:32

## 2021-01-21 RX ADMIN — CIPROFLOXACIN 400 MG: 2 INJECTION, SOLUTION INTRAVENOUS at 09:25

## 2021-01-21 NOTE — ASSESSMENT & PLAN NOTE
Pleasant 77 yo M HD#4 POD#2 s/p percutaneous drain placement for diverticular abscess and secondary right hydroureter  Reports pain is controlled, poor appetite, no N/V, passing flatus and having BMs, urinating without difficulty  Well appearing, abdomen soft, mild TTP in lower abdomen, drain with serosanguinous  Afebrile, vital signs stable  BUN 18, Cr 2 28, Hgb 10 7  Fluid cultures pending  Assessment:  Acute sigmoid diverticulitis  Diverticular abscess  Acute kidney injury  Anemia  Plan:  Nephrology following  Urology following  Continue LR @150  Repeat CBC/BMP in AM   Continue Zosyn to Cipro/Flagyl  Follow-up on fluid cultures  Discuss with Dr Reyes Barrs

## 2021-01-21 NOTE — NURSING NOTE
Pt ambulates to BR ad deshaun  Requested tylenol x 2 overnight  MANAS drain draining serosanguinous drainage into drainage chamber

## 2021-01-21 NOTE — PROGRESS NOTES
Progress Note Achilles Pulling 1950, 78 y o  male MRN: [id number]    Unit/Bed#: [facility] -02 Encounter: [id number]    Primary Care Provider: Devin Manriquez DO   Date and time admitted to hospital: 1/18/2021 12:37 PM        * Diverticulitis of large intestine with abscess  Assessment & Plan  Pleasant 79 yo M HD#4 POD#2 s/p percutaneous drain placement for diverticular abscess and secondary right hydroureter  Reports pain is controlled, poor appetite, no N/V, passing flatus and having BMs, urinating without difficulty  Well appearing, abdomen soft, mild TTP in lower abdomen, drain with serosanguinous  Afebrile, vital signs stable  BUN 18, Cr 2 28, Hgb 10 7  Fluid cultures pending  Assessment:  Acute sigmoid diverticulitis  Diverticular abscess  Acute kidney injury  Anemia  Plan:  Nephrology following  Urology following  Continue LR @150  Repeat CBC/BMP in AM   Continue Zosyn to Cipro/Flagyl  Follow-up on fluid cultures  Discuss with Dr Isaias White  Subjective/Objective   Chief Complaint: "I'm getting there"    Subjective: Reports no significant changes since yesterday  Appetite is decreased  No nausea or vomiting  Continues to pass flatus, have BMs, and urinate freely  Objective: No overnight events  Blood pressure 154/78, pulse 81, temperature 98 °F (36 7 °C), temperature source Temporal, resp  rate 18, height 5' 7" (1 702 m), weight 83 1 kg (183 lb 3 2 oz), SpO2 98 %  ,Body mass index is 28 69 kg/m²  Intake/Output Summary (Last 24 hours) at 1/21/2021 0957  Last data filed at 1/21/2021 0847  Gross per 24 hour   Intake 1600 ml   Output 260 ml   Net 1340 ml       Invasive Devices     Peripheral Intravenous Line            Peripheral IV 01/20/21 Dorsal (posterior); Right Forearm 1 day          Drain            Closed/Suction Drain Midline Groin Bulb 10 2 Fr  1 day                Physical Exam  Vitals signs and nursing note reviewed     Constitutional:       General: He is not in acute distress  Appearance: He is well-developed  He is not diaphoretic  HENT:      Head: Normocephalic and atraumatic  Eyes:      Conjunctiva/sclera: Conjunctivae normal       Pupils: Pupils are equal, round, and reactive to light  Neck:      Musculoskeletal: Normal range of motion  Pulmonary:      Effort: No respiratory distress  Abdominal:      General: Bowel sounds are normal  There is no distension  Palpations: Abdomen is soft  Tenderness: There is abdominal tenderness (mild lower abdominal tenderness)  Comments: IR drain with serous fluid and clot noted  Line milked  Musculoskeletal: Normal range of motion  Skin:     General: Skin is warm and dry  Capillary Refill: Capillary refill takes less than 2 seconds  Neurological:      Mental Status: He is alert and oriented to person, place, and time  Psychiatric:         Behavior: Behavior normal            Lab, Imaging and other studies:  I have personally reviewed pertinent lab results    , CBC:   Lab Results   Component Value Date    WBC 6 20 01/21/2021    HGB 10 7 (L) 01/21/2021    HCT 32 7 (L) 01/21/2021    MCV 83 01/21/2021     01/21/2021    MCH 27 2 01/21/2021    MCHC 32 6 01/21/2021    RDW 14 4 01/21/2021    MPV 9 5 01/21/2021   , CMP:   Lab Results   Component Value Date    SODIUM 140 01/21/2021    K 3 3 (L) 01/21/2021     (H) 01/21/2021    CO2 22 01/21/2021    BUN 18 01/21/2021    CREATININE 1 35 (H) 01/21/2021    CALCIUM 8 5 (L) 01/21/2021    EGFR 54 01/21/2021     VTE Pharmacologic Prophylaxis: Heparin  VTE Mechanical Prophylaxis: sequential compression device

## 2021-01-21 NOTE — PROGRESS NOTES
Progress Note - [place] Angelita 78 y o  male MRN: [id number]    Unit/Bed#: [facility] -02 Encounter: [id number]      Assessment:  Right hydronephrosis has resolved on ultrasound  Renal function is improving with creatinine down to 1 35  Appreciate Nephrology input  Discussed with Dr Mackenzie Cedillo  Plan:  Continue to observe renal function  No urologic intervention necessary at this time  Subjective:   No new complaints    Objective:     Vitals: Blood pressure 154/78, pulse 81, temperature 98 °F (36 7 °C), temperature source Temporal, resp  rate 18, height 5' 7" (1 702 m), weight 83 1 kg (183 lb 3 2 oz), SpO2 98 %  ,Body mass index is 28 69 kg/m²  Intake/Output Summary (Last 24 hours) at 1/21/2021 0932  Last data filed at 1/21/2021 0847  Gross per 24 hour   Intake 1600 ml   Output 260 ml   Net 1340 ml       Invasive Devices     Peripheral Intravenous Line            Peripheral IV 01/20/21 Dorsal (posterior); Right Forearm less than 1 day          Drain            Closed/Suction Drain Midline Groin Bulb 10 2 Fr  1 day                Lab, Imaging and other studies: I have personally reviewed pertinent reports      VTE Pharmacologic Prophylaxis: Sequential compression device (Venodyne)   VTE Mechanical Prophylaxis: sequential compression device

## 2021-01-21 NOTE — PROGRESS NOTES
NEPHROLOGY PROGRESS NOTE   Domi Liner 78 y o  male MRN: [id number]  Unit/Bed#: [facility] -02 Encounter: [id number]    Assessment/Plan:    In summary, this is a 24-year-old male with recent appendectomy presented to the hospital 1/18 for worsening abdominal pain and fever found to have perforated sigmoid diverticulitis complicated by intra-abdominal abscess and mild right hydroureter status post percutaneous drain placement in IR 01/19/2021  Renal following along for acute kidney injury which has significantly improved from yesterday  1  Acute kidney injury atop chronic kidney disease-resolved  · Urine chemistries suggestive of prerenal etiology which can either be from volume depletion and/or contrast induced nephropathy  Either way, renal function has significantly improved  Resolution of hydronephrosis on repeat imaging  · Plan/recommendations:  · Decreased volume expansion with plan to discontinue  · Continue to hold losartan until renal function remains in steady state  · Avoid further NSAID use at this time and avoid nephrotoxic exposure  · Maximize hemodynamics and provide supportive care  2  Stage 3 chronic kidney disease with baseline creatinine around 1 3-1 5 mg/dL  3  Perforated sigmoid diverticulitis complicated by intra-abdominal abscess and mild right hydroureter status post percutaneous drain placement 1/19  · Per the expertise of surgical colleagues  4  Mild right-sided hydroureter-resolved  · Resolved on repeat imaging  Appreciate urology's recommendations  5  Hypertension  · Losartan can probably be restarted tomorrow  6  Hypokalemia  · Will provide patient with 60 mEq total oral potassium spread throughout the day      ROS  No physical complaints on exam   A complete 10 point review of systems have been performed and are otherwise negative         Historical Information   Past Medical History:   Diagnosis Date    Hypertension     Left bundle branch block      Past Surgical History: Procedure Laterality Date    APPENDECTOMY      BACK SURGERY      TONSILLECTOMY       Social History   Social History     Substance and Sexual Activity   Alcohol Use Yes    Frequency: Monthly or less    Drinks per session: 1 or 2    Binge frequency: Never     Social History     Substance and Sexual Activity   Drug Use Never     Social History     Tobacco Use   Smoking Status Never Smoker   Smokeless Tobacco Never Used       Family History:   History reviewed  No pertinent family history  Medications:  Pertinent medications were reviewed  Current Facility-Administered Medications   Medication Dose Route Frequency Provider Last Rate    acetaminophen  650 mg Oral Q4H PRN Kem Baxter MD      ciprofloxacin  400 mg Intravenous Q12H KATHLEEN Correa-C 400 mg (01/21/21 0925)    heparin (porcine)  5,000 Units Subcutaneous Q12H [facility] Kem Baxter MD      lactated ringers  150 mL/hr Intravenous Continuous Kem Baxter MD Stopped (01/21/21 9575)    meperidine  25 mg Intravenous Once PRN Alex Mcmillan MD      metroNIDAZOLE  500 mg Intravenous Q8H KATHLEEN Correa-C Stopped (01/21/21 4513)    ondansetron  4 mg Intravenous Q4H PRN Kem Baxter MD      pantoprazole  40 mg Oral Early Morning Kem Baxter MD           Allergies   Allergen Reactions    Hydrocodone-Acetaminophen Other (See Comments)     disoriented         Vitals:   /78 (BP Location: Left arm)   Pulse 81   Temp 98 °F (36 7 °C) (Temporal)   Resp 18   Ht 5' 7" (1 702 m)   Wt 83 1 kg (183 lb 3 2 oz)   SpO2 98%   BMI 28 69 kg/m²   Body mass index is 28 69 kg/m²    SpO2: 98 %,   SpO2 Activity: At Rest,   O2 Device: None (Room air)      Intake/Output Summary (Last 24 hours) at 1/21/2021 0956  Last data filed at 1/21/2021 0847  Gross per 24 hour   Intake 1600 ml   Output 260 ml   Net 1340 ml     Invasive Devices     Peripheral Intravenous Line            Peripheral IV 01/20/21 Dorsal (posterior); Right Forearm 1 day          Drain            Closed/Suction Drain Midline Groin Bulb 10 2 Fr  1 day                Physical Exam  General: conscious, cooperative, in no acute distress  Eyes: conjunctivae pink, anicteric sclerae  ENT: lips and mucous membranes moist  Neck: supple, no JVD, no masses  Chest: clear breath sounds bilateral, no crackles, ronchus or wheezings  CVS: S1 & S2, normal rate, regular rhythm  Abdomen: soft, non-tender, non-distended, normoactive bowel sounds left drain in place  Extremities: no edema of both legs  Skin: no rash  Neuro: awake, alert, oriented      Diagnostic Data:  Lab: I have personally reviewed pertinent lab results  ,   CBC:  Results from last 7 days   Lab Units 01/21/21  0905   WBC Thousand/uL 6 20   HEMOGLOBIN g/dL 10 7*   HEMATOCRIT % 32 7*   PLATELETS Thousands/uL 158      CMP:   Lab Results   Component Value Date    SODIUM 140 01/21/2021    K 3 3 (L) 01/21/2021     (H) 01/21/2021    CO2 22 01/21/2021    BUN 18 01/21/2021    CREATININE 1 35 (H) 01/21/2021    CALCIUM 8 5 (L) 01/21/2021    EGFR 54 01/21/2021   ,   PT/INR: No results found for: PT, INR,   Magnesium: No components found for: MAG,  Phosphorous: No results found for: PHOS    Microbiology:  @LABRCNTIP,(urinecx:7)@        TEDDY Wallace    Portions of the record may have been created with voice recognition software  Occasional wrong word or "sound a like" substitutions may have occurred due to the inherent limitations of voice recognition software  Read the chart carefully and recognize, using context, where substitutions have occurred

## 2021-01-22 ENCOUNTER — TELEPHONE (OUTPATIENT)
Dept: NEPHROLOGY | Facility: CLINIC | Age: 71
End: 2021-01-22

## 2021-01-22 VITALS
BODY MASS INDEX: 29.27 KG/M2 | TEMPERATURE: 99 F | HEIGHT: 67 IN | HEART RATE: 61 BPM | OXYGEN SATURATION: 96 % | SYSTOLIC BLOOD PRESSURE: 159 MMHG | RESPIRATION RATE: 18 BRPM | DIASTOLIC BLOOD PRESSURE: 77 MMHG | WEIGHT: 186.51 LBS

## 2021-01-22 LAB
ANION GAP SERPL CALCULATED.3IONS-SCNC: 7 MMOL/L (ref 4–13)
BUN SERPL-MCNC: 12 MG/DL (ref 7–25)
CALCIUM SERPL-MCNC: 8.7 MG/DL (ref 8.6–10.5)
CHLORIDE SERPL-SCNC: 109 MMOL/L (ref 98–107)
CO2 SERPL-SCNC: 25 MMOL/L (ref 21–31)
CREAT SERPL-MCNC: 1.18 MG/DL (ref 0.7–1.3)
FERRITIN SERPL-MCNC: 325 NG/ML (ref 8–388)
GFR SERPL CREATININE-BSD FRML MDRD: 63 ML/MIN/1.73SQ M
GLUCOSE SERPL-MCNC: 95 MG/DL (ref 65–99)
IRON SATN MFR SERPL: 23 %
IRON SERPL-MCNC: 46 UG/DL (ref 65–175)
POTASSIUM SERPL-SCNC: 3.6 MMOL/L (ref 3.5–5.5)
SODIUM SERPL-SCNC: 141 MMOL/L (ref 134–143)
TIBC SERPL-MCNC: 196 UG/DL (ref 250–450)

## 2021-01-22 PROCEDURE — 83550 IRON BINDING TEST: CPT | Performed by: NURSE PRACTITIONER

## 2021-01-22 PROCEDURE — 82728 ASSAY OF FERRITIN: CPT | Performed by: NURSE PRACTITIONER

## 2021-01-22 PROCEDURE — 83540 ASSAY OF IRON: CPT | Performed by: NURSE PRACTITIONER

## 2021-01-22 PROCEDURE — 80048 BASIC METABOLIC PNL TOTAL CA: CPT | Performed by: NURSE PRACTITIONER

## 2021-01-22 PROCEDURE — 99232 SBSQ HOSP IP/OBS MODERATE 35: CPT | Performed by: INTERNAL MEDICINE

## 2021-01-22 PROCEDURE — 99238 HOSP IP/OBS DSCHRG MGMT 30/<: CPT | Performed by: PHYSICIAN ASSISTANT

## 2021-01-22 RX ORDER — LOSARTAN POTASSIUM 100 MG/1
100 TABLET ORAL DAILY
Qty: 30 TABLET | Refills: 0 | Status: SHIPPED | OUTPATIENT
Start: 2021-01-22 | End: 2021-01-22 | Stop reason: HOSPADM

## 2021-01-22 RX ORDER — LOSARTAN POTASSIUM 50 MG/1
100 TABLET ORAL DAILY
Status: DISCONTINUED | OUTPATIENT
Start: 2021-01-22 | End: 2021-01-22 | Stop reason: HOSPADM

## 2021-01-22 RX ORDER — CIPROFLOXACIN 500 MG/1
500 TABLET, FILM COATED ORAL EVERY 12 HOURS SCHEDULED
Qty: 10 TABLET | Refills: 0 | Status: SHIPPED | OUTPATIENT
Start: 2021-01-22 | End: 2021-01-27

## 2021-01-22 RX ORDER — METRONIDAZOLE 500 MG/1
500 TABLET ORAL EVERY 8 HOURS SCHEDULED
Qty: 15 TABLET | Refills: 0 | Status: SHIPPED | OUTPATIENT
Start: 2021-01-22 | End: 2021-01-27

## 2021-01-22 RX ADMIN — METRONIDAZOLE 500 MG: 500 SOLUTION INTRAVENOUS at 11:19

## 2021-01-22 RX ADMIN — CIPROFLOXACIN 400 MG: 2 INJECTION, SOLUTION INTRAVENOUS at 10:00

## 2021-01-22 RX ADMIN — LOSARTAN POTASSIUM 100 MG: 50 TABLET, FILM COATED ORAL at 09:58

## 2021-01-22 RX ADMIN — PANTOPRAZOLE SODIUM 40 MG: 40 TABLET, DELAYED RELEASE ORAL at 05:13

## 2021-01-22 RX ADMIN — HEPARIN SODIUM 5000 UNITS: 5000 INJECTION INTRAVENOUS; SUBCUTANEOUS at 09:57

## 2021-01-22 RX ADMIN — METRONIDAZOLE 500 MG: 500 SOLUTION INTRAVENOUS at 03:30

## 2021-01-22 NOTE — DISCHARGE SUMMARY
Discharge- Jamal Clark 1950, 78 y o  male MRN: [id number]    Unit/Bed#: [facility] -02 Encounter: [id number]    Primary Care Provider: Rob Saravia DO   Date and time admitted to hospital: 1/18/2021 12:37 PM        * Diverticulitis of large intestine with abscess  Assessment & Plan  Pearl 79 yo M HD#5 POD#3 s/p percutaneous drain placement for diverticular abscess  Reports feeling well, pain controlled, tolerating low residue diet, having formed BMs  Well appearing, drain with serosanguinous  Afebrile, vital signs stable  BUN 12, Cr 1 18  Fluid cultures pending  Assessment:  Acute sigmoid diverticulitis  Diverticular abscess  Acute kidney injury  Anemia  Plan: AMARIS resolved  IVF discontinued  Will discharge home on cipro/flagyl x 5 days  Will keep IR drain and follow-up as outpatient  Follow-up with general surgery in 2 weeks for follow-up  Plan to discharge home when cleared by Nephrology  Resolved Problems  Date Reviewed: 1/18/2021    None          Admission Date:   Admission Orders (From admission, onward)     Ordered        01/18/21 1845  Inpatient Admission  Once                     Admitting Diagnosis: Abdominal pain [R10 9]  Fever [R50 9]  Diverticulitis of intestine with abscess [K57 80]    HPI:  Pearl 77-year-old male presenting to the hospital for acute abdominal pain and fever  Procedures Performed: No orders of the defined types were placed in this encounter  Summary of Hospital Course: Workup in the ER included CT scan revealing perforated sigmoid diverticulitis with abscess and secondary hydroureter on the right side  Decision made to admit to the General Surgical Service with consultation with Interventional Radiology and urology  Treatment initiated with anti-inflammatories using Toradol and antibiotics with Zosyn  Hospital day 2 patient reported improvement of his symptoms, had persistent low-grade temperature of 100 1°    Evaluated by Interventional Radiology who was able to place a percutaneous pigtail drain catheter into the abscess  Rise in his serum creatinine noted for which Toradol was discontinued  Hospital day 3 noted to have additional rise in his serum creatinine  Consultation with Urology completed for which renal ultrasound was performed to evaluate the previously noted hydroureter  Nephrology also consulted and made recommendations for IV fluid resuscitation for the AMARIS  Patient reported ongoing improvement of his symptoms  Temperature down to 99 3  Hospital day 4 serum creatinine decreased  Again symptoms improving  Ongoing bowel movements and tolerating his diet  Tolerating IV fluid resuscitation  Hospital day 5/date of discharge  Patient found to be clinically and hemodynamically stable  Serum creatinine returned to baseline  IV fluids discontinued  Drain output is scant serous with clot  Pending nephrology clearance plan for discharge home with outpatient IR and general surgery follow-up  Will discharge home on oral antibiotics pending final results of fluid cultures  Significant Findings, Care, Treatment and Services Provided:  IV antibiotics and IR drainage for perforated sigmoid diverticulitis, IV fluid resuscitation for AMARIS, monitoring for right-sided hydroureter  Complications:  None    Condition at Discharge: stable         Discharge instructions/Information to patient and family:   See after visit summary for information provided to patient and family  Provisions for Follow-Up Care:  See after visit summary for information related to follow-up care and any pertinent home health orders  PCP: Yeni Hernandez DO    Disposition: Home    Planned Readmission: No    Discharge Statement   I spent 15 minutes discharging the patient  This time was spent on the day of discharge  I had direct contact with the patient on the day of discharge   Additional documentation is required if more than 30 minutes were spent on discharge  Discharge Medications:  See after visit summary for reconciled discharge medications provided to patient and family

## 2021-01-22 NOTE — DISCHARGE INSTRUCTIONS
[address]  Interventional Radiology    [address]: [phone] (M-F 7:30am - 4:00pm)  Off hours or no answer: [phone] (Ask for IR on call)           [address], [address]  after your procedure:    Resume your normal diet  Small sips of flat soda will help with nausea  1  The properly functioning catheter should be forward flushed once (1x) daily with 10ml of normal saline using clean technique  You will be given a prescription for flushes  To flush the tube, clean both connections with alcohol swab  Twist off the drainage bag/ bulb  tubing and twist the saline syringe into the drainage tube and flush  Remove the syringe and twist the drainage bag / bulb tubing tubing back on     2  The drainage bag/bulb may be emptied as necessary  Keep a record of the amount of fluid you drain from your tube  This should be done with clean technique as well  3  A fresh dressing should be applied daily over the tube insertion site  4  As the tube is secured to the skin with only a suture,try not to pull on your tube  Tub baths are not permitted  Showers are permitted if the patient's skin entry site is prevented from getting wet  Similarly, washcloth "baths" are acceptable  Contact Interventional Radiology at [phone] [place] PATIENTS: Contact Interventional Radiology at [phone]) [place] PATIENTS: Contact Interventional Radiology at [phone]) if:    1  Leakage or large amounts of liquid around the catheter  2  Fever of 101 degrees lasting several hours without other obvious cause (such as sore throat, flu, etc)  3  Persistent nausea or vomiting  4  Diminished drainage, which may be associated with pressure or pain  Or when the     drainage from your tube is less than 10mls for 48 hours  5  Catheter pulled back or falls out  The following pharmacies carry the flush syringes         [address] [facility]                         [address] [facility]  Phone [phone]            Phone [phone] [address] [facility]                                [phone]  [address] [place] [address] [place] Bhanu WANG                      [address] [place]  Phone [phone]            Phone [phone]                      Gabrielle Floyd                                                                                                          [phone]  [facility]  [address]  [facility]  Phone [phone]          Diverticulitis Diet   WHAT YOU NEED TO KNOW:   A diverticulitis diet includes foods that allow your intestines to rest while you have diverticulitis  Diverticulitis is a condition that causes small pockets along your intestine called diverticula to become inflamed or infected  This is caused by hard bowel movement, food, or bacteria that get stuck in the pockets  DISCHARGE INSTRUCTIONS:   Foods that may be recommended while you have diverticulitis:   · A clear liquid diet may be recommended for 2 to 3 days  A clear liquid diet includes clear liquids, and foods that are liquid at room temperature  Examples include the following:     ? Water and clear juices (such as apple, cranberry, or grape), strained citrus juices or fruit punch    ? Coffee or tea (without cream or milk)    ? Clear sports drinks or soft drinks, such as ginger ale, lemon-lime soda, or club soda (no cola or root beer)    ?  Clear broth, bouillon, or consommé    ? Plain popsicles (no popsicles with pureed fruit or fiber)    ? Flavored gelatin without fruit    · Low-fiber foods may be recommended until your symptoms improve  Examples include the following:     ? Cream of wheat and finely ground grits    ? White bread, white pasta, and white rice    ? Canned and well-cooked fruit without skins or seeds, and juice without pulp    ? Canned and well-cooked vegetables without skins or seeds, and vegetable juice    ? Cow's milk, lactose-free milk, soy milk, and rice milk    ? Yogurt, cottage cheese, and sherbet    ? Eggs, poultry (such as chicken and turkey), fish, and tender, ground, well-cooked beef     ? Tofu and smooth nut butters, such as peanut butter    ? Broth and strained soups made of low-fiber foods    High-fiber foods  can help prevent diverticulosis and diverticulitis  Your healthcare provider will tell you when you can add high-fiber foods back into your diet  Examples include the following:  · Whole grains and breads, and cereals made with whole grains    · Dried fruit, fresh fruit with skin, and fruit pulp    · Raw vegetables    · Cooked greens, such as spinach    · Tough meat and meat with gristle    · Legumes, such as jin beans and lentils    Contact your healthcare provider if:   · Your symptoms do not get better, or they get worse  · You have questions about the foods you should eat  · You have questions or concerns about your condition or care  © Copyright [address] Information is for End User's use only and may not be sold, redistributed or otherwise used for commercial purposes  All illustrations and images included in CareNotes® are the copyrighted property of [employer]  or [facility] Marj Starkey   The above information is an  only  It is not intended as medical advice for individual conditions or treatments   Talk to your doctor, nurse or pharmacist before following any medical regimen to see if it is safe and effective for you  Diverticulitis   WHAT YOU NEED TO KNOW:   Diverticulitis is a condition that causes small pockets along your intestine called diverticula to become inflamed or infected  This is caused by hard bowel movements, food, or bacteria that get stuck in the pockets  DISCHARGE INSTRUCTIONS:   Return to the emergency department if:   · You have bowel movement or foul-smelling discharge leaking from your vagina or in your urine  · You have severe diarrhea  · You urinate less than usual or not at all  · You are not able to have a bowel movement  · You cannot stop vomiting  · You have severe abdominal pain, a fever, and your abdomen is larger than usual      · You have new or increased blood in your bowel movements  Contact your healthcare provider if:   · You have pain when you urinate  · Your symptoms get worse or do not go away  · You have questions or concerns about your condition or care  Medicines:   · Antibiotics  may be given to help treat a bacterial infection  · Prescription pain medicine  may be given  Ask your healthcare provider how to take this medicine safely  Some prescription pain medicines contain acetaminophen  Do not take other medicines that contain acetaminophen without talking to your healthcare provider  Too much acetaminophen may cause liver damage  Prescription pain medicine may cause constipation  Ask your healthcare provider how to prevent or treat constipation  · Take your medicine as directed  Contact your healthcare provider if you think your medicine is not helping or if you have side effects  Tell him or her if you are allergic to any medicine  Keep a list of the medicines, vitamins, and herbs you take  Include the amounts, and when and why you take them  Bring the list or the pill bottles to follow-up visits  Carry your medicine list with you in case of an emergency      Clear liquid diet:  A clear liquid diet includes any liquids that you can see through  Examples include water, ginger-xu, cranberry or apple juice, frozen fruit ice, or broth  Stay on a clear liquid diet until your symptoms are gone, or as directed  Follow up with your healthcare provider as directed: You may need to return for a colonoscopy  When your symptoms are gone, you may need a low-fat, high-fiber diet to prevent diverticulitis from developing again  Your healthcare provider or dietitian can help you create meal plans  Write down your questions so you remember to ask them during your visits  © Copyright [address] [place] [address] Drive Information is for End User's use only and may not be sold, redistributed or otherwise used for commercial purposes  All illustrations and images included in CareNotes® are the copyrighted property of [employer]  or [facility] Marj Starkey   The above information is an  only  It is not intended as medical advice for individual conditions or treatments  Talk to your doctor, nurse or pharmacist before following any medical regimen to see if it is safe and effective for you

## 2021-01-22 NOTE — NURSING NOTE
Pt ambulates in room ad deshaun  Requested prn tylenol x 1 overnight  Given dose of IV Zofran at hs which alleviated nausea after taking po potassium  Dsg around MANAS drain site clean, dry and intact  Serosanguinous drainage draining into collection chamber  Continues on LR @75ml/hr

## 2021-01-22 NOTE — PLAN OF CARE
Problem: Potential for Falls  Goal: Patient will remain free of falls  Description: INTERVENTIONS:  - Assess patient frequently for physical needs  -  Identify cognitive and physical deficits and behaviors that affect risk of falls    -  [place] fall precautions as indicated by assessment   - Educate patient/family on patient safety including physical limitations  - Instruct patient to call for assistance with activity based on assessment  - Modify environment to reduce risk of injury  - Consider OT/PT consult to assist with strengthening/mobility  Outcome: Progressing     Problem: PAIN - ADULT  Goal: Verbalizes/displays adequate comfort level or baseline comfort level  Description: Interventions:  - Encourage patient to monitor pain and request assistance  - Assess pain using appropriate pain scale  - Administer analgesics based on type and severity of pain and evaluate response  - Implement non-pharmacological measures as appropriate and evaluate response  - Consider cultural and social influences on pain and pain management  - Notify physician/advanced practitioner if interventions unsuccessful or patient reports new pain  Outcome: Progressing     Problem: INFECTION - ADULT  Goal: Absence or prevention of progression during hospitalization  Description: INTERVENTIONS:  - Assess and monitor for signs and symptoms of infection  - Monitor lab/diagnostic results  - Monitor all insertion sites, i e  indwelling lines, tubes, and drains  - Monitor endotracheal if appropriate and nasal secretions for changes in amount and color  - [place] appropriate cooling/warming therapies per order  - Administer medications as ordered  - Instruct and encourage patient and family to use good hand hygiene technique  - Identify and instruct in appropriate isolation precautions for identified infection/condition  Outcome: Progressing  Goal: Absence of fever/infection during neutropenic period  Description: INTERVENTIONS:  - Monitor WBC    Outcome: Progressing     Problem: SAFETY ADULT  Goal: Maintain or return to baseline ADL function  Description: INTERVENTIONS:  -  Assess patient's ability to carry out ADLs; assess patient's baseline for ADL function and identify physical deficits which impact ability to perform ADLs (bathing, care of mouth/teeth, toileting, grooming, dressing, etc )  - Assess/evaluate cause of self-care deficits   - Assess range of motion  - Assess patient's mobility; develop plan if impaired  - Assess patient's need for assistive devices and provide as appropriate  - Encourage maximum independence but intervene and supervise when necessary  - Involve family in performance of ADLs  - Assess for home care needs following discharge   - Consider OT consult to assist with ADL evaluation and planning for discharge  - Provide patient education as appropriate  Outcome: Progressing  Goal: Maintain or return mobility status to optimal level  Description: INTERVENTIONS:  - Assess patient's baseline mobility status (ambulation, transfers, stairs, etc )    - Identify cognitive and physical deficits and behaviors that affect mobility  - Identify mobility aids required to assist with transfers and/or ambulation (gait belt, sit-to-stand, lift, walker, cane, etc )  - [place] fall precautions as indicated by assessment  - Record patient progress and toleration of activity level on Mobility SBAR; progress patient to next Phase/Stage  - Instruct patient to call for assistance with activity based on assessment  - Consider rehabilitation consult to assist with strengthening/weightbearing, etc   Outcome: Progressing     Problem: DISCHARGE PLANNING  Goal: Discharge to home or other facility with appropriate resources  Description: INTERVENTIONS:  - Identify barriers to discharge w/patient and caregiver  - Arrange for needed discharge resources and transportation as appropriate  - Identify discharge learning needs (meds, wound care, etc )  - Arrange for interpretive services to assist at discharge as needed  - Refer to Case Management Department for coordinating discharge planning if the patient needs post-hospital services based on physician/advanced practitioner order or complex needs related to functional status, cognitive ability, or social support system  Outcome: Progressing     Problem: Knowledge Deficit  Goal: Patient/family/caregiver demonstrates understanding of disease process, treatment plan, medications, and discharge instructions  Description: Complete learning assessment and assess knowledge base    Interventions:  - Provide teaching at level of understanding  - Provide teaching via preferred learning methods  Outcome: Progressing

## 2021-01-22 NOTE — TELEPHONE ENCOUNTER
----- Message from  Yvonne Glez Gerald sent at 1/22/2021  2:48 PM EST -----  Hi  Please schedule Rubén for hospital follow-up with me  He was discharged from [address] [facility] today  Blood work was ordered  Thank you!

## 2021-01-22 NOTE — NURSING NOTE
Interdisciplinary team deemed safest means of transport home is via car  Discharge instructions, medications, follow up appt, MANAS drain care reviewed with pt  All questions answered  Pt was in good spirits and ready to go home  No s/s of distress noted  Pt walked out of hospital independently accompied by his wife and PCA and entered care independently

## 2021-01-22 NOTE — DISCHARGE INSTR - AVS FIRST PAGE
Dr Ho Nogueira from Nephrology requested you change to Losartan from Irbesartan  This is because you are recovering from an acute kidney injury  You may resume your normal medication for a short duration upon discharge, but you should call to discuss this with your PCP as soon as you can

## 2021-01-22 NOTE — PROGRESS NOTES
Progress Note - Nephrology   Italia Patch 78 y o  male MRN: [id number]  Unit/Bed#: [facility] -02 Encounter: [id number]    A/P:  1  Acute kidney injury atop CKD  Resolved back to his baseline  He had prerenal azotemia due to contrast and volume depletion  He had right hydronephrosis which has resolved  2  CKD 3 with baseline creatinine of 1 3-1 5mg/dl  Will need follow up as outpatient  3  Perforated sigmoid diverticulitis complicated by intra abdominal abscess and mild right hydroureter s/p percutaneous drain placement 1/19  Surgery closely following  4  Hypertension  Blood pressure elevated  Will restart losartan (on irbesartan 300mg at home)  Would avoid ibuprofen as an outpatient and take Tylenol as needed for pain      Follow up reason for today's visit: Acute kidney injury    Diverticulitis of large intestine with abscess    Patient Active Problem List   Diagnosis    Hypertension    Diverticulitis of large intestine with abscess         Subjective:   Denies headache, dizziness, chest pain, dyspnea, NVD or dysuria Appetite excellent    Objective:     Vitals: Blood pressure 159/77, pulse 61, temperature 99 °F (37 2 °C), temperature source Temporal, resp  rate 18, height 5' 7" (1 702 m), weight 84 6 kg (186 lb 8 2 oz), SpO2 96 %  ,Body mass index is 29 21 kg/m²  Weight (last 2 days)     Date/Time   Weight    01/22/21 0556   84 6 (186 51)    01/21/21 0540   83 1 (183 2)    01/20/21 0600   82 2 (181 33)                Intake/Output Summary (Last 24 hours) at 1/22/2021 0907  Last data filed at 1/22/2021 0845  Gross per 24 hour   Intake 1994 ml   Output --   Net 1994 ml     I/O last 3 completed shifts:   In: 1874 [P O :340; I V :1534]  Out: 10 [Drains:10]         Physical Exam: /77 (BP Location: Right arm)   Pulse 61   Temp 99 °F (37 2 °C) (Temporal)   Resp 18   Ht 5' 7" (1 702 m)   Wt 84 6 kg (186 lb 8 2 oz)   SpO2 96%   BMI 29 21 kg/m²     General Appearance:    Alert, cooperative, no distress, appears stated age   Head:    Normocephalic, without obvious abnormality, atraumatic   Eyes:    Conjunctiva/corneas clear   Ears:    Normal external ears   Nose:   Nares normal, septum midline, mucosa normal, no drainage    or sinus tenderness   Throat:   Lips, mucosa, and tongue normal; teeth and gums normal   Neck:   Supple, symmetrical, trachea midline, no adenopathy;        thyroid:  No enlargement/tenderness/nodules; no carotid    bruit or JVD   Back:     Symmetric, no curvature, ROM normal, no CVA tenderness   Lungs:     Clear to auscultation bilaterally, respirations unlabored   Chest wall:    No tenderness or deformity   Heart:    Regular rate and rhythm, S1 and S2 normal, no murmur, rub   or gallop   Abdomen:     Soft, non-tender, bowel sounds active   Extremities:   Extremities normal, atraumatic, no cyanosis or edema   Skin:   Skin color, texture, turgor normal, no rashes or lesions   Lymph nodes:   Cervical normal   Neurologic:   CNII-XII intact            Lab, Imaging and other studies: I have personally reviewed pertinent labs  CBC: No results found for: WBC, HGB, HCT, MCV, PLT, ADJUSTEDWBC, MCH, MCHC, RDW, MPV, NRBC  CMP:   Lab Results   Component Value Date    K 3 6 01/22/2021     (H) 01/22/2021    CO2 25 01/22/2021    BUN 12 01/22/2021    CREATININE 1 18 01/22/2021    CALCIUM 8 7 01/22/2021    EGFR 63 01/22/2021         Results from last 7 days   Lab Units 01/22/21  0556 01/21/21  0500 01/20/21  0438  01/18/21  1320   POTASSIUM mmol/L 3 6 3 3* 4 0   < > 3 9   CHLORIDE mmol/L 109* 108* 105   < > 102   CO2 mmol/L 25 22 25   < > 25   BUN mg/dL 12 18 28*   < > 19   CREATININE mg/dL 1 18 1 35* 2 28*   < > 1 22   CALCIUM mg/dL 8 7 8 5* 8 4*   < > 9 8   ALK PHOS U/L  --   --   --   --  60   ALT U/L  --   --   --   --  13   AST U/L  --   --   --   --  12*    < > = values in this interval not displayed           Phosphorus: No results found for: PHOS  Magnesium: No results found for: MG  Urinalysis: No results found for: Azul Spivey, SPECGRAV, PHUR, LEUKOCYTESUR, NITRITE, PROTEINUA, GLUCOSEU, KETONESU, BILIRUBINUR, BLOODU  Ionized Calcium: No results found for: CAION  Coagulation: No results found for: PT, INR, APTT  Troponin: No results found for: TROPONINI  ABG: No results found for: PHART, QJX1ROZ, PO2ART, LJY4JWX, G0AVDAUZ, BEART, SOURCE  Radiology review:     IMAGING  Procedure: Us Kidney And Bladder    Result Date: 1/21/2021  Narrative: RENAL ULTRASOUND INDICATION:   hydro  COMPARISON: None TECHNIQUE:   Ultrasound of the retroperitoneum was performed with a curvilinear transducer utilizing volumetric sweeps and still imaging techniques  FINDINGS: KIDNEYS: Symmetric and normal size  Right kidney:  9 8 x 5 6 x 5 1 cm  Left kidney:  11 3 x 5 4 x 4 4 cm  Right kidney Normal echogenicity and contour  No suspicious masses detected  No hydronephrosis  No shadowing calculi  No perinephric fluid collections  Left kidney Normal echogenicity and contour  No suspicious masses detected  No hydronephrosis  No shadowing calculi  No perinephric fluid collections  URETERS: Nonvisualized  BLADDER: Evaluation of the bladder is limited        Impression: Normal kidneys   Workstation performed: [id number]       Current Facility-Administered Medications   Medication Dose Route Frequency    acetaminophen (TYLENOL) tablet 650 mg  650 mg Oral Q4H PRN    ciprofloxacin (CIPRO) IVPB (premix in 5% dextrose) 400 mg 200 mL  400 mg Intravenous Q12H    heparin (porcine) subcutaneous injection 5,000 Units  5,000 Units Subcutaneous Q12H AMILCAR    meperidine (DEMEROL) injection 25 mg  25 mg Intravenous Once PRN    metroNIDAZOLE (FLAGYL) IVPB (premix) 500 mg 100 mL  500 mg Intravenous Q8H    ondansetron (ZOFRAN) injection 4 mg  4 mg Intravenous Q4H PRN    pantoprazole (PROTONIX) EC tablet 40 mg  40 mg Oral Early Morning     Medications Discontinued During This Encounter   Medication Reason    ketorolac (TORADOL) injection 15 mg  meperidine (DEMEROL) injection 25 mg     piperacillin-tazobactam (ZOSYN) 3 375 g in sodium chloride 0 9 % 100 mL IVPB     losartan (COZAAR) tablet 100 mg        Luis Armando Bernal MD      This progress note was produced in part using a dictation device which may document imprecise wording from author's original intent

## 2021-01-22 NOTE — CASE MANAGEMENT
LOS: 4, URR score:8 and Green, Pt is not a 3 0 day re-admit  Chart reviewed for d/c planning  Pt stable for d/c  Home today per surgery  IR drain will remain in and pt will follow-up as outpatient  Pt spouse will transport home  No other CM d/c needs identified at this time

## 2021-01-22 NOTE — ASSESSMENT & PLAN NOTE
Pleasant 77 yo M HD#5 POD#3 s/p percutaneous drain placement for diverticular abscess  Reports feeling well, pain controlled, tolerating low residue diet, having formed BMs  Well appearing, drain with serosanguinous  Afebrile, vital signs stable  BUN 12, Cr 1 18  Fluid cultures pending  Assessment:  Acute sigmoid diverticulitis  Diverticular abscess  Acute kidney injury  Anemia  Plan: AMARIS resolved  IVF discontinued  Will discharge home on cipro/flagyl x 5 days  Will keep IR drain and follow-up as outpatient  Follow-up with general surgery in 2 weeks for follow-up  Plan to discharge home when cleared by Nephrology

## 2021-01-23 ENCOUNTER — TRANSCRIBE ORDERS (OUTPATIENT)
Dept: URGENT CARE | Facility: CLINIC | Age: 71
End: 2021-01-23

## 2021-01-23 LAB
BACTERIA BLD CULT: NORMAL
BACTERIA BLD CULT: NORMAL
BACTERIA SPEC BFLD CULT: ABNORMAL
BACTERIA SPEC BFLD CULT: ABNORMAL
GRAM STN SPEC: ABNORMAL
GRAM STN SPEC: ABNORMAL

## 2021-01-25 ENCOUNTER — TELEPHONE (OUTPATIENT)
Dept: INTERVENTIONAL RADIOLOGY/VASCULAR | Facility: HOSPITAL | Age: 71
End: 2021-01-25

## 2021-01-25 ENCOUNTER — TELEPHONE (OUTPATIENT)
Dept: NEPHROLOGY | Facility: CLINIC | Age: 71
End: 2021-01-25

## 2021-01-25 DIAGNOSIS — N17.9 AKI (ACUTE KIDNEY INJURY) (HCC): Primary | ICD-10-CM

## 2021-01-25 NOTE — TELEPHONE ENCOUNTER
Sorry about that  Lab work is in  [facility] 5 kidney function, electrolytes, blood count

## 2021-01-25 NOTE — TELEPHONE ENCOUNTER
Call from patients wife stating patient was to have additional lab work done  She states they went to the lab but there was no order in system  He has upcoming appt (hospital f/u was d/c on 1/22) on 1/28/21, does he need more lab work?

## 2021-01-28 ENCOUNTER — LAB (OUTPATIENT)
Dept: LAB | Facility: CLINIC | Age: 71
End: 2021-01-28
Payer: COMMERCIAL

## 2021-01-28 ENCOUNTER — OFFICE VISIT (OUTPATIENT)
Dept: NEPHROLOGY | Facility: CLINIC | Age: 71
End: 2021-01-28
Payer: COMMERCIAL

## 2021-01-28 VITALS
DIASTOLIC BLOOD PRESSURE: 84 MMHG | BODY MASS INDEX: 28.09 KG/M2 | TEMPERATURE: 98.2 F | SYSTOLIC BLOOD PRESSURE: 158 MMHG | WEIGHT: 179 LBS | HEIGHT: 67 IN | OXYGEN SATURATION: 98 % | HEART RATE: 77 BPM

## 2021-01-28 DIAGNOSIS — D50.8 IRON DEFICIENCY ANEMIA DUE TO DIETARY CAUSES: ICD-10-CM

## 2021-01-28 DIAGNOSIS — N18.2 CKD (CHRONIC KIDNEY DISEASE) STAGE 2, GFR 60-89 ML/MIN: Primary | ICD-10-CM

## 2021-01-28 DIAGNOSIS — N17.9 AKI (ACUTE KIDNEY INJURY) (HCC): ICD-10-CM

## 2021-01-28 DIAGNOSIS — I10 ESSENTIAL HYPERTENSION: ICD-10-CM

## 2021-01-28 LAB
ALBUMIN SERPL BCP-MCNC: 3.5 G/DL (ref 3.5–5)
ALP SERPL-CCNC: 133 U/L (ref 46–116)
ALT SERPL W P-5'-P-CCNC: 29 U/L (ref 12–78)
ANION GAP SERPL CALCULATED.3IONS-SCNC: 1 MMOL/L (ref 4–13)
AST SERPL W P-5'-P-CCNC: 27 U/L (ref 5–45)
BASOPHILS # BLD AUTO: 0.08 THOUSANDS/ΜL (ref 0–0.1)
BASOPHILS NFR BLD AUTO: 1 % (ref 0–1)
BILIRUB SERPL-MCNC: 0.35 MG/DL (ref 0.2–1)
BUN SERPL-MCNC: 12 MG/DL (ref 5–25)
CALCIUM SERPL-MCNC: 9.6 MG/DL (ref 8.3–10.1)
CHLORIDE SERPL-SCNC: 108 MMOL/L (ref 100–108)
CO2 SERPL-SCNC: 33 MMOL/L (ref 21–32)
CREAT SERPL-MCNC: 1.2 MG/DL (ref 0.6–1.3)
EOSINOPHIL # BLD AUTO: 0.17 THOUSAND/ΜL (ref 0–0.61)
EOSINOPHIL NFR BLD AUTO: 2 % (ref 0–6)
ERYTHROCYTE [DISTWIDTH] IN BLOOD BY AUTOMATED COUNT: 14.4 % (ref 11.6–15.1)
GFR SERPL CREATININE-BSD FRML MDRD: 62 ML/MIN/1.73SQ M
GLUCOSE P FAST SERPL-MCNC: 109 MG/DL (ref 65–99)
HCT VFR BLD AUTO: 44.3 % (ref 36.5–49.3)
HGB BLD-MCNC: 13.7 G/DL (ref 12–17)
IMM GRANULOCYTES # BLD AUTO: 0.17 THOUSAND/UL (ref 0–0.2)
IMM GRANULOCYTES NFR BLD AUTO: 2 % (ref 0–2)
LYMPHOCYTES # BLD AUTO: 2.09 THOUSANDS/ΜL (ref 0.6–4.47)
LYMPHOCYTES NFR BLD AUTO: 21 % (ref 14–44)
MCH RBC QN AUTO: 26.9 PG (ref 26.8–34.3)
MCHC RBC AUTO-ENTMCNC: 30.9 G/DL (ref 31.4–37.4)
MCV RBC AUTO: 87 FL (ref 82–98)
MONOCYTES # BLD AUTO: 0.93 THOUSAND/ΜL (ref 0.17–1.22)
MONOCYTES NFR BLD AUTO: 9 % (ref 4–12)
NEUTROPHILS # BLD AUTO: 6.63 THOUSANDS/ΜL (ref 1.85–7.62)
NEUTS SEG NFR BLD AUTO: 65 % (ref 43–75)
NRBC BLD AUTO-RTO: 0 /100 WBCS
PLATELET # BLD AUTO: 512 THOUSANDS/UL (ref 149–390)
PMV BLD AUTO: 10.7 FL (ref 8.9–12.7)
POTASSIUM SERPL-SCNC: 4 MMOL/L (ref 3.5–5.3)
PROT SERPL-MCNC: 7.9 G/DL (ref 6.4–8.2)
RBC # BLD AUTO: 5.09 MILLION/UL (ref 3.88–5.62)
SODIUM SERPL-SCNC: 142 MMOL/L (ref 136–145)
WBC # BLD AUTO: 10.07 THOUSAND/UL (ref 4.31–10.16)

## 2021-01-28 PROCEDURE — 36415 COLL VENOUS BLD VENIPUNCTURE: CPT

## 2021-01-28 PROCEDURE — 99213 OFFICE O/P EST LOW 20 MIN: CPT | Performed by: NURSE PRACTITIONER

## 2021-01-28 PROCEDURE — 85025 COMPLETE CBC W/AUTO DIFF WBC: CPT

## 2021-01-28 PROCEDURE — 80053 COMPREHEN METABOLIC PANEL: CPT

## 2021-01-28 NOTE — PATIENT INSTRUCTIONS
Recommend Proferrin ES over the counter iron 1 tablet a day if your vitamin does not have iron  Get blood work done in 6 months including iron panel   Increase the irbesartan back to 300 mg daily

## 2021-01-28 NOTE — PROGRESS NOTES
Nephrology   Office Follow-Up  Pauly Mendez 78 y o  male MRN: [id number]    Encounter: [id number]        Lawrence was seen in the [facility] office today  All diagnoses and orders for visit:     1  CKD (chronic kidney disease) stage 2, GFR 60-89 ml/min  · Recent AMARIS resolved  Baseline creatinine appears to be around 1 2 mg/dL  Post-hospital labs pending  Risk factors for chronicity include hypertension, age, recent AMARIS  Arb was restarted  He will avoid NSAIDs     -     Comprehensive metabolic panel; Future; Expected date: 05/28/2021   -     Microalbumin / creatinine urine ratio; Future; Expected date: 05/28/2021   -     Urinalysis with microscopic; Future; Expected date: 05/28/2021  2  Iron deficiency anemia due to dietary causes  · Last colonoscopy less than 10 years ago normal  Recommend Proferrin OTC 1 tab a day  -     CBC and differential; Future; Expected date: 05/28/2021   -     Iron Panel (Includes Ferritin, Iron Sat%, Iron, and TIBC); Future; Expected date: 05/28/2021  3  Essential hypertension  · Blood pressure above goal  Has only been taking half an Avapro  Will increase back to whole tab  4  Perforated sigmoid diverticulitis complicated by intra-abdominal abscess and mild right hydroureter status post percutaneous drain placement 1/19  · Has follow-up on Monday  5  Mild right-sided hydroureter-resolved     HPI: Pauly Mendez is a 78 y o  male with an active problem list significant for CKD 2/3a, hypertension, recent appendectomy, s/p hospitalization at [address] [place] [address] from 1/18-1/22/11 for perforated sigmoid diverticulitis complicated by intraabdominal abscess and mild right hydroureter and AMARIS  Renal followed along for AMARIS which resolved prior to discharge (admitting sCr 1 2 mg/dL -> 2 3 mg/dL peak -> 1 18 mg/dL day of discharge)  Outpatient labs pending  Upon exam patient has no physical complaints   Still has percutaneous drain in place and will have follow-up appt this Monday  Feeling well otherwise  Blood pressure is a bit above goal but he is only taking half an Avapro pill a day  Will increase back up to 300 mg per day  He will avoid NSAIDs  He will start taking Proferrin ES 1 tab a day for 6 mos then recheck lab work  He will RTO 1 year with updated blood work  The medical record, including Care Everywhere and media tabs were reviewed  ROS:   Review of Systems   Constitutional: Negative  HENT: Negative  Eyes: Negative  Respiratory: Negative  Cardiovascular: Negative  Gastrointestinal: Negative  Endocrine: Negative  Genitourinary: Negative  Musculoskeletal: Negative  Allergic/Immunologic: Negative  Neurological: Negative  Hematological: Negative  Psychiatric/Behavioral: Negative  All other systems reviewed and are negative  Allergies: Hydrocodone-acetaminophen    Medications:   Current Outpatient Medications:     acetaminophen (TYLENOL) 500 mg tablet, Take 500 mg by mouth every 6 (six) hours as needed for mild pain, Disp: , Rfl:     irbesartan (AVAPRO) 300 mg tablet, Take 300 mg by mouth daily Taking 150 mg, Disp: , Rfl:     sodium chloride, PF, 0 9 %, 10 mL by Intracatheter route daily Intracatheter flushing daily, Disp: 30 Syringe, Rfl: 0    Past Medical History:   Diagnosis Date    Hypertension     Left bundle branch block      Past Surgical History:   Procedure Laterality Date    APPENDECTOMY      BACK SURGERY      IR DRAINAGE TUBE PLACEMENT  1/19/2021    TONSILLECTOMY       History reviewed  No pertinent family history  reports that he has never smoked  He has never used smokeless tobacco  He reports current alcohol use  He reports that he does not use drugs        Physical Exam:   Vitals:    01/28/21 1026   BP: 158/84   BP Location: Left arm   Patient Position: Sitting   Cuff Size: Standard   Pulse: 77   Temp: 98 2 °F (36 8 °C)   TempSrc: Temporal   SpO2: 98%   Weight: 81 2 kg (179 lb)   Height: 5' 7" (0 702 m)     Body mass index is 28 04 kg/m²  General: conscious, cooperative, in no acute distress, appears stated age  Eyes: conjunctivae pale, anicteric sclerae  ENT: lips and mucous membranes moist  Neck: supple, no JVD, no masses  Chest:  essentially clear breath sounds bilaterally, no crackles, ronchus or wheezings  CVS: S1 & S2, normal rate, regular rhythm  Abdomen: soft, non-tender, non-distended, normoactive bowel sounds, rounded, + rlq drain  Extremities: no edema of both legs  Skin: no rash   Neuro: awake, alert, oriented       Diagnostic Data:  Lab: I have personally reviewed pertinent lab results  ,   CBC:       CMP: No results found for: SODIUM, K, CL, CO2, ANIONGAP, BUN, CREATININE, GLUCOSE, CALCIUM, AST, ALT, ALKPHOS, PROT, BILITOT, EGFR,   PT/INR: No results found for: PT, INR,   Magnesium: No components found for: MAG,  Phosphorous: No results found for: PHOS    Patient Instructions   Recommend Proferrin ES over the counter iron 1 tablet a day if your vitamin does not have iron  Get blood work done in 6 months including iron panel   Increase the irbesartan back to 300 mg daily      Portions of the record may have been created with voice recognition software  Occasional wrong word or "sound a like" substitutions may have occurred due to the inherent limitations of voice recognition software  Read the chart carefully and recognize, using context, where substitutions have occurred  If you have any questions, please contact the dictating provider

## 2021-02-03 ENCOUNTER — TELEPHONE (OUTPATIENT)
Dept: NEPHROLOGY | Facility: CLINIC | Age: 71
End: 2021-02-03

## 2021-02-03 NOTE — TELEPHONE ENCOUNTER
----- Message from Daren Rosas, [address] sent at 2/2/2021 10:39 AM EST -----  Hi  Please let Cruz Alcantar know that kidney function is stable and remains at his baseline function

## 2021-02-04 ENCOUNTER — HOSPITAL ENCOUNTER (OUTPATIENT)
Dept: INTERVENTIONAL RADIOLOGY/VASCULAR | Facility: HOSPITAL | Age: 71
Discharge: HOME | End: 2021-02-04
Attending: RADIOLOGY | Admitting: RADIOLOGY
Payer: COMMERCIAL

## 2021-02-04 DIAGNOSIS — K57.20 COLONIC DIVERTICULAR ABSCESS: ICD-10-CM

## 2021-02-04 PROCEDURE — 49424 ASSESS CYST CONTRAST INJECT: CPT | Performed by: RADIOLOGY

## 2021-02-04 PROCEDURE — 49424 ASSESS CYST CONTRAST INJECT: CPT

## 2021-02-04 PROCEDURE — 76080 X-RAY EXAM OF FISTULA: CPT

## 2021-02-04 PROCEDURE — 76080 X-RAY EXAM OF FISTULA: CPT | Performed by: RADIOLOGY

## 2021-02-04 RX ADMIN — IOHEXOL 10 ML: 300 INJECTION, SOLUTION INTRAVENOUS at 14:57

## 2021-02-04 NOTE — DISCHARGE INSTRUCTIONS
[address]  Interventional Radiology  Dr [address]: [phone] (M-F 7:30am - 4:00pm)  Off hours or no answer: [phone] (Ask for IR on call)        Acute Wound Care   AMBULATORY CARE:   An acute wound  is an injury that causes a break in the skin  An acute wound can happen suddenly, last a short time, and may heal on its own  Common signs and symptoms of an acute wound:   · A cut, tear, or gash in your skin    · Bleeding, swelling, pain, or trouble moving the affected area    · Dirt or foreign objects inside the wound     · Milky, yellow, green, or brown pus in the wound     · Red, tender, or warm area around the pus    · Fever  Seek care immediately if:   · You have pus or a foul odor coming from the wound  · You have sudden trouble breathing or chest pain  · Blood soaks through your bandage  Contact your healthcare provider if:   · You have muscle, joint, or body aches, sweating, or a fever  · You have more swelling, redness, or bleeding in your wound  · Your skin is itchy, swollen, or you have a rash  · You have questions or concerns about your condition or care  Treatment for an acute wound  may include any of the following:  · Cleansing  is done with soap and water to wash away germs and decrease the risk of infection  Sterile water further cleans the wound  The cleaning is done under high pressure with a catheter tip and large syringe  A solution that kills germs may also be used  · Debridement  is done to clean and remove objects, dirt, or dead tissues from the open wound  Healthcare providers may also drain the wound to clean out pus  · Closure of the wound  is done with stitches, staples, skin adhesive, or other treatments  This may be done if the wound is wide or deep  Stitches may be needed if the wound is in an area that moves a lot, such as the hands, feet, and joints  Stitches may help to keep the wound from getting infected  They may also decrease the amount of scarring you have  Some wounds may heal better without stitches  Wound care:   · If your wound was closed with thin strips of medical tape, keep them clean and dry  The strips of medical tape will fall off on their own  Do not pull them off  · Keep the bandage clean and dry  Do not remove the bandage over your wound unless your healthcare provider says it is okay  · Wash your hands before and after you take care of your wound to prevent infection  · Clean the wound as directed  If you cannot reach the wound, have someone help you  · If you have packing, make sure all the gauze used to pack the wound is taken out and replaced as directed  Keep track of how many gauze dressings are placed inside the wound  Follow up with your healthcare provider as directed:  Write down your questions so you remember to ask them during your visits  © 2016 [address] is for End User's use only and may not be sold, redistributed or otherwise used for commercial purposes  All illustrations and images included in CareNotes® are the copyrighted property of [employer]  or Hector Harper  The above information is an  only  It is not intended as medical advice for individual conditions or treatments  Talk to your doctor, nurse or pharmacist before following any medical regimen to see if it is safe and effective for you   utilized [id number].    This is a 75F with PMH of T2DM, HTN, asthma, hypothyroidism, vitiligo, CAD s/p MI, presents for inability to ambulate and weakness/loss of sensation to lower extremity. Hx is notable for hospitalization from 8/5-8/7 for the same issue. She was dx w/ macrocytic anemia 2' B12 def and b/l LE DVT on Eliquis. Upon return to home, pt reports still difficulty w/ ambulation and persistent weakness/loss of sensation. She returns to the hospital for evaluation. Per pt, this started 2 weeks ago when she experienced numbness/heaviness at the 2nd, 3rd, 4th digits of the right hand. Similar deficits later spread to her b/l feet. Over time, she had increasing weakness and loss of sensations preventing her from ambulating. Her right hand deficits remained stable. She denied any recent travels, gastrointestinal infections (diarrhea), insect bites. She was treated for a UTI on her previous hospitalization. She reports no fevers, chills, SOB, chest pain, NV, difficulty voiding/passing stool, or urinary/stool incontinent. She reports no other areas w/ sensory deficits.

## 2021-02-05 ENCOUNTER — OFFICE VISIT (OUTPATIENT)
Dept: SURGERY | Facility: CLINIC | Age: 71
End: 2021-02-05
Payer: COMMERCIAL

## 2021-02-05 VITALS
HEART RATE: 65 BPM | SYSTOLIC BLOOD PRESSURE: 122 MMHG | HEIGHT: 67 IN | BODY MASS INDEX: 27.31 KG/M2 | RESPIRATION RATE: 18 BRPM | WEIGHT: 174 LBS | DIASTOLIC BLOOD PRESSURE: 70 MMHG | TEMPERATURE: 97.6 F

## 2021-02-05 DIAGNOSIS — K57.20 DIVERTICULITIS OF LARGE INTESTINE WITH ABSCESS WITHOUT BLEEDING: Primary | ICD-10-CM

## 2021-02-05 PROCEDURE — 1036F TOBACCO NON-USER: CPT | Performed by: PHYSICIAN ASSISTANT

## 2021-02-05 PROCEDURE — 99213 OFFICE O/P EST LOW 20 MIN: CPT | Performed by: PHYSICIAN ASSISTANT

## 2021-02-05 PROCEDURE — 1160F RVW MEDS BY RX/DR IN RCRD: CPT | Performed by: PHYSICIAN ASSISTANT

## 2021-02-05 PROCEDURE — 3008F BODY MASS INDEX DOCD: CPT | Performed by: PHYSICIAN ASSISTANT

## 2021-02-05 NOTE — PROGRESS NOTES
Progress Note - General Surgery   Austin Lewis 78 y o  male MRN: [id number]  Encounter: [id number]    Assessment/Plan    Diverticulitis of large intestine with abscess  Rubén appears well after hospitalization for perforated diverticulitis and abscess  Had drain removed this morning by IR  Following low fiber diet and having regular BMs  Pain controlled  On exam his abdomen is soft and non-tender  Drain site without signs of infection  I advised continued low residue diet until follow-up in 4 weeks  At that time I asked him to follow-up with Dr Clary Del Castillo to discuss surgical intervention and any additional testing that may be indicated  He is agreeable to this  All questions answered  Diagnoses and all orders for this visit:    Diverticulitis of large intestine with abscess without bleeding        Subjective       Chief Complaint   Patient presents with    Follow-up     f/u diverticulitis      Patient is here today for follow up diverticulitis  Stated that he is doing well no nausea, vomiting, diarrhea, fever or chills  Neeta Osorioly    Pearl 79 yo M here for follow-up after admission for diverticulitis  Reports feeling well  Had drain removed today  Tolerating low residue diet  No nausea or vomiting  Having formed BMs  No fever or chills  Pain controlled  Review of Systems   Constitutional: Negative for chills and fever  Gastrointestinal: Negative for abdominal distention, constipation, diarrhea, nausea and vomiting  The following portions of the patient's history were reviewed and updated as appropriate: allergies, current medications, past family history, past medical history, past social history, past surgical history and problem list     Objective      Blood pressure 122/70, pulse 65, temperature 97 6 °F (36 4 °C), temperature source Tympanic, resp  rate 18, height 5' 7" (1 702 m), weight 78 9 kg (174 lb)  Physical Exam  Vitals signs and nursing note reviewed     Constitutional: General: He is not in acute distress  Appearance: He is well-developed  He is not diaphoretic  HENT:      Head: Normocephalic and atraumatic  Eyes:      Conjunctiva/sclera: Conjunctivae normal       Pupils: Pupils are equal, round, and reactive to light  Neck:      Musculoskeletal: Normal range of motion  Pulmonary:      Effort: No respiratory distress  Abdominal:      Comments: Flat, soft, non-tender  Drain site clean with dressing  Musculoskeletal: Normal range of motion  Skin:     General: Skin is warm and dry  Capillary Refill: Capillary refill takes less than 2 seconds  Neurological:      Mental Status: He is alert and oriented to person, place, and time     Psychiatric:         Behavior: Behavior normal          Signature:  Ignacio Keller PA-C  Date: 2/12/2021 Time: 9:58 AM

## 2021-02-12 NOTE — ASSESSMENT & PLAN NOTE
Nena Shepherd appears well after hospitalization for perforated diverticulitis and abscess  Had drain removed this morning by IR  Following low fiber diet and having regular BMs  Pain controlled  On exam his abdomen is soft and non-tender  Drain site without signs of infection  I advised continued low residue diet until follow-up in 4 weeks  At that time I asked him to follow-up with Dr Roger Prescott to discuss surgical intervention and any additional testing that may be indicated  He is agreeable to this  All questions answered

## 2021-02-22 ENCOUNTER — TELEPHONE (OUTPATIENT)
Dept: SURGERY | Facility: CLINIC | Age: 71
End: 2021-02-22

## 2021-02-22 NOTE — TELEPHONE ENCOUNTER
Rubén called in to cn appt  Stated that he is feeling better and after speaking with his PCP will be seeking 2nd opinion  Msg passed onto Dr Gonzalez Bad

## 2021-03-10 DIAGNOSIS — Z23 ENCOUNTER FOR IMMUNIZATION: ICD-10-CM

## 2021-03-16 ENCOUNTER — IMMUNIZATIONS (OUTPATIENT)
Dept: FAMILY MEDICINE CLINIC | Facility: HOSPITAL | Age: 71
End: 2021-03-16

## 2021-03-16 DIAGNOSIS — Z23 ENCOUNTER FOR IMMUNIZATION: Primary | ICD-10-CM

## 2021-03-16 PROCEDURE — 0011A SARS-COV-2 / COVID-19 MRNA VACCINE (MODERNA) 100 MCG: CPT

## 2021-03-16 PROCEDURE — 91301 SARS-COV-2 / COVID-19 MRNA VACCINE (MODERNA) 100 MCG: CPT

## 2021-04-15 ENCOUNTER — IMMUNIZATIONS (OUTPATIENT)
Dept: FAMILY MEDICINE CLINIC | Facility: HOSPITAL | Age: 71
End: 2021-04-15

## 2021-04-15 DIAGNOSIS — Z23 ENCOUNTER FOR IMMUNIZATION: Primary | ICD-10-CM

## 2021-04-15 PROCEDURE — 91301 SARS-COV-2 / COVID-19 MRNA VACCINE (MODERNA) 100 MCG: CPT

## 2021-04-15 PROCEDURE — 0012A SARS-COV-2 / COVID-19 MRNA VACCINE (MODERNA) 100 MCG: CPT

## 2021-05-04 ENCOUNTER — TELEPHONE (OUTPATIENT)
Dept: SURGERY | Facility: CLINIC | Age: 71
End: 2021-05-04

## 2021-05-04 NOTE — TELEPHONE ENCOUNTER
Spoke with patient in regards to a script we received for a refill on his losartan  I stated that he would need to call his PCP  Patient was very appreciative that we called

## 2022-07-16 ENCOUNTER — APPOINTMENT (OUTPATIENT)
Dept: RADIOLOGY | Facility: CLINIC | Age: 72
End: 2022-07-16
Payer: COMMERCIAL

## 2022-07-16 DIAGNOSIS — M25.561 RIGHT KNEE PAIN, UNSPECIFIED CHRONICITY: ICD-10-CM

## 2022-07-16 PROCEDURE — 73564 X-RAY EXAM KNEE 4 OR MORE: CPT

## 2022-08-25 ENCOUNTER — EVALUATION (OUTPATIENT)
Dept: PHYSICAL THERAPY | Age: 72
End: 2022-08-25
Payer: COMMERCIAL

## 2022-08-25 DIAGNOSIS — M25.561 ACUTE PAIN OF RIGHT KNEE: Primary | ICD-10-CM

## 2022-08-25 PROCEDURE — 97110 THERAPEUTIC EXERCISES: CPT | Performed by: PHYSICAL THERAPIST

## 2022-08-25 PROCEDURE — 97161 PT EVAL LOW COMPLEX 20 MIN: CPT | Performed by: PHYSICAL THERAPIST

## 2022-08-25 PROCEDURE — 97140 MANUAL THERAPY 1/> REGIONS: CPT | Performed by: PHYSICAL THERAPIST

## 2022-08-25 NOTE — PROGRESS NOTES
PT Evaluation     Today's date: 2022  Patient name: Julia Shah  : 1950  MRN: [id number]  Referring provider: Sasha Ivey*  Dx:   Encounter Diagnosis     ICD-10-CM    1  Acute pain of right knee  M25 [id number]        Start Time:   Stop Time: 1710  Total time in clinic (min): 55 minutes    Assessment  Assessment details: Julia Shah is a 81 y o  male who presents with pain, decreased strength and locking right knee  Due to these impairments, Patient has difficulty performing recreational activities  Patient's clinical presentation is consistent with their referring diagnosis of right knee pain  Patient feeling better as in between seasons for [employer] job  Wants to get stronger so able to squat for job  Patient would benefit from skilled physical therapy to address their aforementioned impairments, improve their level of function and to improve their overall quality of life  Impairments: abnormal or restricted ROM, activity intolerance, impaired physical strength, lacks appropriate home exercise program, pain with function, poor posture  and poor body mechanics    Symptom irritability: lowUnderstanding of Dx/Px/POC: excellent  Goals  ST-3 WEEKS  1  Decrease pain right knee < 2/10 on VAS at its worst   2   Decrease locking R knee < 1x/day  3   Increase right knee strength > 4+/5  LT-6 WEEKS  1  Patient to be independent with a/iadls  2  Increase functional activities for leisure and home activities to previous LOF    3  Independent with HEP and/or fitness program     Plan  Patient would benefit from: skilled physical therapy  Planned modality interventions: cryotherapy, thermotherapy: hydrocollator packs and unattended electrical stimulation  Planned therapy interventions: activity modification, behavior modification, body mechanics training, flexibility, functional ROM exercises, home exercise program, IADL retraining, joint mobilization, manual therapy, neuromuscular re-education, patient education, postural training, strengthening, stretching, therapeutic activities and therapeutic exercise  Frequency: 2-3x week  Duration in weeks: 8  Plan of Care beginning date: 2022  Plan of Care expiration date: 10/25/2022  Treatment plan discussed with: patient        Subjective Evaluation    History of Present Illness  Mechanism of injury: Patient reports his knee started to have be painful in April  He saw MD and had xrays, then saw ortho who referred for OPT  His knee started to lock on him past 2 weeks  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 so feeling a little better currently              Not a recurrent problem   Quality of life: excellent    Pain  Current pain ratin  At worst pain ratin  Location: right knee  Quality: discomfort, dull ache and sharp  Relieving factors: medications, ice and rest  Aggravating factors: stair climbing  Progression: improved    Social Support  Steps to enter house: yes  Stairs in house: yes   Lives in: multiple-level home  Lives with: spouse    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)    Diagnostic Tests  X-ray: normal  Patient Goals  Patient goals for therapy: decreased pain, increased strength and return to sport/leisure activities  Patient goal: no locking R knee        Objective     Tenderness     Right Knee   Tenderness in the medial joint line       Neurological Testing     Sensation     Knee     Right Knee   Intact: light touch     Active Range of Motion   Left Knee   Flexion: 150 degrees   Extension: 0 degrees     Right Knee   Flexion: 148 degrees   Extension: 0 degrees     Mobility   Patellar Mobility:     Right Knee   WFL: medial, lateral, superior and inferior    Additional Mobility Details  Crepitus noted R with patella mobility    Strength/Myotome Testing     Lumbar   Left   Normal strength    Right Hip   Planes of Motion   Flexion: 5  Abduction: 5  Adduction: 5    Right Knee   Flexion: 4+  Extension: 4    Additional Strength Details  Crepitus PF-joint with TKE RLE    Tests     Right Knee   Positive medial Milo, patella-femoral grind and valgus stress test at 30 degrees  Negative anterior drawer  Ambulation   Weight-Bearing Status   Assistive device used: none    Ambulation: Level Surfaces   Ambulation without assistive device: independent    Ambulation: Stairs   Pattern: reciprocal  Railings: without rails  Pattern: reciprocal  Railings: without rails    Observational Gait   Gait: within functional limits     Functional Assessment        Single Leg Stance   Left: 10 seconds  Right: 10 seconds    General Comments:    Lower quarter screen   Hips: unremarkable  Foot/ankle: unremarkable             Precautions: HTN, L4-5 fusion         Daily Treatment Dairy:  Manuals 8/25            R knee  8'                                                   Neuro Re-Ed                                       Ther Ex             bridge 30x            SLR R 30x            SLR w/ER R 30x            R SAQ 2# 30x            R LAQ 2# 30x            Wall sits w/ball 30x                         Slant board L3 30" 4x            Nustep 5' L5            Hip abduction bl band 30x            Hip adduction Ball 30x                                                   Ther Activity                                       Gait Training                                       Modalities

## 2022-08-25 NOTE — LETTER
2022    Albina Escalante MD  [address]    Patient: Zoraida Duque   YOB: 1950   Date of Visit: 2022     Encounter Diagnosis     ICD-10-CM    1  Acute pain of right knee  M25 561        Dear Dr Venson Kocher: Thank you for your recent referral of Zoraida Duque  Please review the attached evaluation summary from Rubné's recent visit  Please verify that you agree with the plan of care by signing the attached order  If you have any questions or concerns, please do not hesitate to call  I sincerely appreciate the opportunity to share in the care of one of your patients and hope to have another opportunity to work with you in the near future  Sincerely,    Marie Prieto, PT      Referring Provider:      I certify that I have read the below Plan of Care and certify the need for these services furnished under this plan of treatment while under my care  Albina Escalante MD  [address]  Via Fax: [phone]          PT Evaluation     Today's date: 2022  Patient name: Zoraida Duque  : 1950  MRN: [id number]  Referring provider: Justino Duarte*  Dx:   Encounter Diagnosis     ICD-10-CM    1  Acute pain of right knee  M25 561        Start Time: 1615  Stop Time: 1710  Total time in clinic (min): 55 minutes    Assessment  Assessment details: Zoraida Duque is a 81 y o  male who presents with pain, decreased strength and locking right knee  Due to these impairments, Patient has difficulty performing recreational activities  Patient's clinical presentation is consistent with their referring diagnosis of right knee pain  Patient feeling better as in between seasons for [employer]  Wants to get stronger so able to squat for job   Patient would benefit from skilled physical therapy to address their aforementioned impairments, improve their level of function and to improve their overall quality of life  Impairments: abnormal or restricted ROM, activity intolerance, impaired physical strength, lacks appropriate home exercise program, pain with function, poor posture  and poor body mechanics    Symptom irritability: lowUnderstanding of Dx/Px/POC: excellent  Goals  ST-3 WEEKS  1  Decrease pain right knee < 2/10 on VAS at its worst   2   Decrease locking R knee < 1x/day  3   Increase right knee strength > 4+/5  LT-6 WEEKS  1  Patient to be independent with a/iadls  2  Increase functional activities for leisure and home activities to previous LOF  3  Independent with HEP and/or fitness program     Plan  Patient would benefit from: skilled physical therapy  Planned modality interventions: cryotherapy, thermotherapy: hydrocollator packs and unattended electrical stimulation  Planned therapy interventions: activity modification, behavior modification, body mechanics training, flexibility, functional ROM exercises, home exercise program, IADL retraining, joint mobilization, manual therapy, neuromuscular re-education, patient education, postural training, strengthening, stretching, therapeutic activities and therapeutic exercise  Frequency: 2-3x week  Duration in weeks: 8  Plan of Care beginning date: 2022  Plan of Care expiration date: 10/25/2022  Treatment plan discussed with: patient        Subjective Evaluation    History of Present Illness  Mechanism of injury: Patient reports his knee started to have be painful in April  He saw MD and had xrays, then saw ortho who referred for OPT  His knee started to lock on him past 2 weeks  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 so feeling a little better currently              Not a recurrent problem   Quality of life: excellent    Pain  Current pain ratin  At worst pain ratin  Location: right knee  Quality: discomfort, dull ache and sharp  Relieving factors: medications, ice and rest  Aggravating factors: stair climbing  Progression: improved    Social Support  Steps to enter house: yes  Stairs in house: yes   Lives in: multiple-level home  Lives with: spouse    Employment status: not working ([employer] part time)    Diagnostic Tests  X-ray: normal  Patient Goals  Patient goals for therapy: decreased pain, increased strength and return to sport/leisure activities  Patient goal: no locking R knee        Objective     Tenderness     Right Knee   Tenderness in the medial joint line  Neurological Testing     Sensation     Knee     Right Knee   Intact: light touch     Active Range of Motion   Left Knee   Flexion: 150 degrees   Extension: 0 degrees     Right Knee   Flexion: 148 degrees   Extension: 0 degrees     Mobility   Patellar Mobility:     Right Knee   WFL: medial, lateral, superior and inferior    Additional Mobility Details  Crepitus noted R with patella mobility    Strength/Myotome Testing     Lumbar   Left   Normal strength    Right Hip   Planes of Motion   Flexion: 5  Abduction: 5  Adduction: 5    Right Knee   Flexion: 4+  Extension: 4    Additional Strength Details  Crepitus PF-joint with TKE RLE    Tests     Right Knee   Positive medial Milo, patella-femoral grind and valgus stress test at 30 degrees  Negative anterior drawer  Ambulation   Weight-Bearing Status   Assistive device used: none    Ambulation: Level Surfaces   Ambulation without assistive device: independent    Ambulation: Stairs   Pattern: reciprocal  Railings: without rails  Pattern: reciprocal  Railings: without rails    Observational Gait   Gait: within functional limits     Functional Assessment        Single Leg Stance   Left: 10 seconds  Right: 10 seconds    General Comments:    Lower quarter screen   Hips: unremarkable  Foot/ankle: unremarkable             Precautions: HTN, L4-5 fusion         Daily Treatment Dairy:  Manuals 8/25            R knee  8'                                                   Neuro Re-Ed Ther Ex             bridge 30x            SLR R 30x            SLR w/ER R 30x            R SAQ 2# 30x            R LAQ 2# 30x            Wall sits w/ball 30x                         Slant board L3 30" 4x            Nustep 5' L5            Hip abduction bl band 30x            Hip adduction Ball 30x                                                   Ther Activity                                       Gait Training                                       Modalities

## 2022-08-29 ENCOUNTER — APPOINTMENT (OUTPATIENT)
Dept: PHYSICAL THERAPY | Age: 72
End: 2022-08-29
Payer: COMMERCIAL

## 2022-08-30 ENCOUNTER — OFFICE VISIT (OUTPATIENT)
Dept: PHYSICAL THERAPY | Age: 72
End: 2022-08-30
Payer: COMMERCIAL

## 2022-08-30 DIAGNOSIS — M25.561 ACUTE PAIN OF RIGHT KNEE: Primary | ICD-10-CM

## 2022-08-30 PROCEDURE — 97140 MANUAL THERAPY 1/> REGIONS: CPT | Performed by: PHYSICAL THERAPIST

## 2022-08-30 PROCEDURE — 97110 THERAPEUTIC EXERCISES: CPT | Performed by: PHYSICAL THERAPIST

## 2022-08-30 NOTE — PROGRESS NOTES
Daily Note     Today's date: 2022  Patient name: Concetta Jeffery  : 1950  MRN: [id number]  Referring provider: Eliel Figueroa*  Dx:   Encounter Diagnosis     ICD-10-CM    1  Acute pain of right knee  M25 561        Start Time: 161  Stop Time: 1700  Total time in clinic (min): 45 minutes    Subjective: Patient reports his right foot swelled after last session possible gout or OA/pt  Patient reports kneeling is worse but has been avoiding it  Objective: See treatment diary below      Assessment: Tolerated treatment well  Patient would benefit from continued PT  Medial knee pain  C/o right foot pain also but works thru it  Plan: Continue per plan of care  Precautions: HTN, L4-5 fusion         Daily Treatment Dairy:  Manuals            R knee  8' 8'                                                  Neuro Re-Ed                                       Ther Ex             Bridgew/ ball 30x 30x           SLR R 30x 2# 30x           SLR w/ER R 30x 2# 30x           R SAQ 2# 30x 3# 30x           R LAQ 2# 30x 3# 30x           Wall sits w/ball 30x 30x           R heel slides  30x           Slant board L3 30" 4x 4x           Nustep 5' L5 8' L5           Hip abduction bl band 30x 35# 30x           Hip adduction Ball 30x 35# 30x                                                  Ther Activity                                       Gait Training                                       Modalities

## 2022-09-02 ENCOUNTER — OFFICE VISIT (OUTPATIENT)
Dept: PHYSICAL THERAPY | Age: 72
End: 2022-09-02
Payer: COMMERCIAL

## 2022-09-02 DIAGNOSIS — M25.561 ACUTE PAIN OF RIGHT KNEE: Primary | ICD-10-CM

## 2022-09-02 PROCEDURE — 97110 THERAPEUTIC EXERCISES: CPT

## 2022-09-02 NOTE — PROGRESS NOTES
Daily Note     Today's date: 2022  Patient name: Nancy Cottrell  : 1950  MRN: [id number]  Referring provider: Lashaun Cannon*  Dx:   Encounter Diagnosis     ICD-10-CM    1  Acute pain of right knee  M25 561                   Subjective: Patient repots no pain in R knee, great toe continues to be painful when walking and at times is tender to touch  Reports possible gouty arthritis, plans to have blood work up through PCP at routine visit  Objective: See treatment diary below      Assessment: Good tolerance to outlined TE program  Limitations present in hamstring length during PROM  Occasional vc provided for pace and maintenance of exercise, good carry over present  Some great toe discomfort present during standing however not limiting  Patient would benefit from continued PT  Plan: Continue per plan of care  Precautions: HTN, L4-5 fusion         Daily Treatment Dairy:  Manuals           R knee  8' 8' 8'                                                 Neuro Re-Ed                                       Ther Ex             Bridgew/ ball 30x 30x 30x          SLR R 30x 2# 30x 2#/30x          SLR w/ER R 30x 2# 30x 2#/30x          R SAQ 2# 30x 3# 30x 3#/30x          R LAQ 2# 30x 3# 30x 3#/30x          Wall sits w/ball 30x 30x 30x          R heel slides  30x 30x          Slant board L3 30" 4x 4x 4x          Nustep 5' L5 8' L5 8' L5          Hip abduction bl band 30x 35# 30x 35# 30x          Hip adduction Ball 30x 35# 30x 35# 30x                                                 Ther Activity                                       Gait Training                                       Modalities

## 2022-09-07 ENCOUNTER — OFFICE VISIT (OUTPATIENT)
Dept: PHYSICAL THERAPY | Age: 72
End: 2022-09-07
Payer: COMMERCIAL

## 2022-09-07 DIAGNOSIS — M25.561 ACUTE PAIN OF RIGHT KNEE: Primary | ICD-10-CM

## 2022-09-07 PROCEDURE — 97140 MANUAL THERAPY 1/> REGIONS: CPT

## 2022-09-07 PROCEDURE — 97110 THERAPEUTIC EXERCISES: CPT

## 2022-09-07 NOTE — PROGRESS NOTES
Daily Note     Today's date: 2022  Patient name: Sangeeta Lee  : 1950  MRN: [id number]  Referring provider: Merlin Peel*  Dx:   Encounter Diagnosis     ICD-10-CM    1  Acute pain of right knee  M25 561                   Subjective: Some increased knee discomfort from increased activity over the weekend, persisting great toe discomfort  Patient has F/u with PCP on Friday to address this  Objective: See treatment diary below      Assessment: Continued to demonstrate good tolerance to prescribed exercises  Able to complete despite bouts of discomfort  Trial of K-tape application for medial joint line off load, reviewed wear time and utilization prior to application  Pt denies any allergies to adhesives  Positive response to initial taping, will monitor response NV  Patient would benefit from continued PT  Plan: Continue per plan of care  Precautions: HTN, L4-5 fusion         Daily Treatment Dairy:  Manuals          R knee  8' 8' 8' 8'         K-tape     Patellar support & offload 5'                                   Neuro Re-Ed                                       Ther Ex             Bridgew/ ball 30x 30x 30x 30x         SLR R 30x 2# 30x 2#/30x 2#/30x         SLR w/ER R 30x 2# 30x 2#/30x 2#/30x         R SAQ 2# 30x 3# 30x 3#/30x 3#/30x         R LAQ 2# 30x 3# 30x 3#/30x          Wall sits w/ball 30x 30x 30x 30x         R heel slides  30x 30x          Slant board L3 30" 4x 4x 4x 4x         Nustep 5' L5 8' L5 8' L5 8' L5         Hip abduction bl band 30x 35# 30x 35# 30x 35#/30x         Hip adduction Ball 30x 35# 30x 35# 30x 35# 30x                                                Ther Activity                                       Gait Training                                       Modalities

## 2022-09-08 ENCOUNTER — APPOINTMENT (OUTPATIENT)
Dept: RADIOLOGY | Facility: CLINIC | Age: 72
End: 2022-09-08
Payer: COMMERCIAL

## 2022-09-08 DIAGNOSIS — M79.671 PAIN IN BOTH FEET: ICD-10-CM

## 2022-09-08 DIAGNOSIS — M79.672 PAIN IN BOTH FEET: ICD-10-CM

## 2022-09-08 DIAGNOSIS — M10.9 GOUT OF FOOT, UNSPECIFIED CAUSE, UNSPECIFIED CHRONICITY, UNSPECIFIED LATERALITY: ICD-10-CM

## 2022-09-08 PROCEDURE — 73630 X-RAY EXAM OF FOOT: CPT

## 2022-09-09 ENCOUNTER — OFFICE VISIT (OUTPATIENT)
Dept: PHYSICAL THERAPY | Age: 72
End: 2022-09-09
Payer: COMMERCIAL

## 2022-09-09 DIAGNOSIS — M25.561 ACUTE PAIN OF RIGHT KNEE: Primary | ICD-10-CM

## 2022-09-09 PROCEDURE — 97110 THERAPEUTIC EXERCISES: CPT | Performed by: PHYSICAL THERAPIST

## 2022-09-09 NOTE — PROGRESS NOTES
Daily Note     Today's date: 2022  Patient name: Buddy Chase  : 1950  MRN: [id number]  Referring provider: Josue Tom*  Dx:   Encounter Diagnosis     ICD-10-CM    1  Acute pain of right knee  M25 561        Start Time: 945  Stop Time: 1040  Total time in clinic (min): 55 minutes    Subjective: Patient reports the KT tape really helped, worked behind the plate and did well  He also saw MD and he has gout in right foot and taking medication  Objective: See treatment diary below      Assessment: Tolerated treatment well  Patient would benefit from continued PT  Doing better right knee  Tight ITB also  Plan: Continue per plan of care  Precautions: HTN, L4-5 fusion         Daily Treatment Dairy:  Manuals         R knee, hams  ITB also 8' 8' 8' 8' 8        K-tape     Patellar support & offload 5' 10'                                  Neuro Re-Ed                                       Ther Ex             Bridgew/ ball 30x 30x 30x 30x 30x        SLR R 30x 2# 30x 2#/30x 2#/30x 3# 30x        SLR w/ER R 30x 2# 30x 2#/30x 2#/30x 3# 30x        R SAQ 2# 30x 3# 30x 3#/30x 3#/30x 4# 30x        R LAQ 2# 30x 3# 30x 3#/30x  4# 30x        Wall sits w/ball 30x 30x 30x 30x 30x        R heel slides  30x 30x  D/c        Slant board L3 30" 4x 4x 4x 4x 4x        Bike      5'        Nustep 5' L5 8' L5 8' L5 8' L5 5' L5        Hip abduction bl band 30x 35# 30x 35# 30x 35#/30x 40# 30x        Hip adduction Ball 30x 35# 30x 35# 30x 35# 30x 40# 30x                                               Ther Activity                                       Gait Training                                       Modalities

## 2022-09-13 ENCOUNTER — OFFICE VISIT (OUTPATIENT)
Dept: PHYSICAL THERAPY | Age: 72
End: 2022-09-13
Payer: COMMERCIAL

## 2022-09-13 DIAGNOSIS — M25.561 ACUTE PAIN OF RIGHT KNEE: Primary | ICD-10-CM

## 2022-09-13 PROCEDURE — 97140 MANUAL THERAPY 1/> REGIONS: CPT

## 2022-09-13 PROCEDURE — 97110 THERAPEUTIC EXERCISES: CPT

## 2022-09-13 NOTE — PROGRESS NOTES
Daily Note     Today's date: 2022  Patient name: Anca Du  : 1950  MRN: [id number]  Referring provider: Brenda Berg*  Dx:   Encounter Diagnosis     ICD-10-CM    1  Acute pain of right knee  M25 561                   Subjective: Pt reports no pain currently in his R knee  Objective: See treatment diary below      Assessment: Tolerated treatment well  He reports no increase in pain with exercises today  Performed rec bike for full 8' mins instead of nustep  He tolerates all recent progressions well without complaints  Educated him on KT taping and how he can obtain KT tape from a drugstore, and PT/PTA staff can teach him how to perform, when he is DC from skilled PT  He states he may want to be done with PT this Friday  Patient exhibited good technique with therapeutic exercises and would benefit from continued PT  Plan: Continue per plan of care  Precautions: HTN, L4-5 fusion         Daily Treatment Dairy:  Manuals        R knee, hams  ITB also 8' 8' 8' 8' 8 8'       K-tape     Patellar support & offload 5' 10' 10'                                 Neuro Re-Ed                                       Ther Ex             Bridgew/ ball 30x 30x 30x 30x 30x 30x       SLR R 30x 2# 30x 2#/30x 2#/30x 3# 30x 3# 30x       SLR w/ER R 30x 2# 30x 2#/30x 2#/30x 3# 30x 3# 30x       R SAQ 2# 30x 3# 30x 3#/30x 3#/30x 4# 30x 4# 30x       R LAQ 2# 30x 3# 30x 3#/30x  4# 30x 4# 30x       Wall sits w/ball 30x 30x 30x 30x 30x 30x       R heel slides  30x 30x  D/c        Slant board L3 30" 4x 4x 4x 4x 4x 4x       Bike      5' 8'       Nustep 5' L5 8' L5 8' L5 8' L5 5' L5        Hip abduction bl band 30x 35# 30x 35# 30x 35#/30x 40# 30x 40# 30x       Hip adduction Ball 30x 35# 30x 35# 30x 35# 30x 40# 30x 40# 30x                                              Ther Activity                                       Gait Training                                       Modalities

## 2022-09-16 ENCOUNTER — OFFICE VISIT (OUTPATIENT)
Dept: PHYSICAL THERAPY | Age: 72
End: 2022-09-16
Payer: COMMERCIAL

## 2022-09-16 DIAGNOSIS — M25.561 ACUTE PAIN OF RIGHT KNEE: Primary | ICD-10-CM

## 2022-09-16 PROCEDURE — 97110 THERAPEUTIC EXERCISES: CPT

## 2022-09-16 PROCEDURE — 97140 MANUAL THERAPY 1/> REGIONS: CPT

## 2022-09-16 NOTE — PROGRESS NOTES
Daily Note     Today's date: 2022  Patient name: Marcellus Neely  : 1950  MRN: [id number]  Referring provider: Angi Millard*  Dx:   Encounter Diagnosis     ICD-10-CM    1  Acute pain of right knee  M25 561                   Subjective: K tape continues to be effective for pain modulation and function  MD prescribed ib profen effective for gout  Objective: See treatment diary below      Assessment: ROM within functional limites  Educated on application of k-tape for home use, patient demonstrates verbal understanding  Patient demonstrates good tolerance to outlined program required minimal cuing as patient is predominantly independent with exercises, should make a smooth transition to fitness program        Plan: DC to fitness program per PT and patient agreement  Precautions: HTN, L4-5 fusion         Daily Treatment Dairy:  Manuals       R knee, hams  ITB also 8' 8' 8' 8' 8 8' 8'      K-tape     Patellar support & offload 5' 10' 10'                                 Neuro Re-Ed                                       Ther Ex             Bridgew/ ball 30x 30x 30x 30x 30x 30x 30x      SLR R 30x 2# 30x 2#/30x 2#/30x 3# 30x 3# 30x 3# 30x      SLR w/ER R 30x 2# 30x 2#/30x 2#/30x 3# 30x 3# 30x 3# 30x      R SAQ 2# 30x 3# 30x 3#/30x 3#/30x 4# 30x 4# 30x 4# 30x      R LAQ 2# 30x 3# 30x 3#/30x  4# 30x 4# 30x 4# 30x      Wall sits w/ball 30x 30x 30x 30x 30x 30x 30x      R heel slides  30x 30x  D/c        Slant board L3 30" 4x 4x 4x 4x 4x 4x 4x      Bike      5' 8' 8'      Nustep 5' L5 8' L5 8' L5 8' L5 5' L5        Hip abduction bl band 30x 35# 30x 35# 30x 35#/30x 40# 30x 40# 30x 40# 30x      Hip adduction Ball 30x 35# 30x 35# 30x 35# 30x 40# 30x 40# 30x 40# 30x      Leg press        70# 30x                                Ther Activity                                       Gait Training                                       Modalities

## 2022-09-22 NOTE — PROGRESS NOTES
Patient did well with PT for his right knee, KT tape helpful  Patient having gout in right toe and better with medication  He has returned to prior level of function and doing well  D/c PT to HEP and indep fitness program  All PT goals met